# Patient Record
Sex: MALE | Race: WHITE | NOT HISPANIC OR LATINO | Employment: FULL TIME | URBAN - METROPOLITAN AREA
[De-identification: names, ages, dates, MRNs, and addresses within clinical notes are randomized per-mention and may not be internally consistent; named-entity substitution may affect disease eponyms.]

---

## 2017-02-28 ENCOUNTER — HOSPITAL ENCOUNTER (EMERGENCY)
Facility: CLINIC | Age: 42
Discharge: HOME | End: 2017-02-28
Attending: STUDENT IN AN ORGANIZED HEALTH CARE EDUCATION/TRAINING PROGRAM | Admitting: STUDENT IN AN ORGANIZED HEALTH CARE EDUCATION/TRAINING PROGRAM

## 2017-02-28 ENCOUNTER — APPOINTMENT (OUTPATIENT)
Dept: ULTRASOUND IMAGING | Facility: CLINIC | Age: 42
End: 2017-02-28
Attending: STUDENT IN AN ORGANIZED HEALTH CARE EDUCATION/TRAINING PROGRAM

## 2017-02-28 VITALS — TEMPERATURE: 98.2 F | DIASTOLIC BLOOD PRESSURE: 99 MMHG | SYSTOLIC BLOOD PRESSURE: 143 MMHG | OXYGEN SATURATION: 95 %

## 2017-02-28 DIAGNOSIS — K82.8 THICKENING OF WALL OF GALLBLADDER: ICD-10-CM

## 2017-02-28 DIAGNOSIS — R10.11 RUQ ABDOMINAL PAIN: ICD-10-CM

## 2017-02-28 LAB
ALBUMIN SERPL-MCNC: 3.6 G/DL (ref 3.4–5)
ALBUMIN UR-MCNC: 10 MG/DL
ALP SERPL-CCNC: 71 U/L (ref 40–150)
ALT SERPL W P-5'-P-CCNC: 19 U/L (ref 0–70)
ANION GAP SERPL CALCULATED.3IONS-SCNC: 8 MMOL/L (ref 3–14)
APPEARANCE UR: CLEAR
AST SERPL W P-5'-P-CCNC: 10 U/L (ref 0–45)
BASOPHILS # BLD AUTO: 0.1 10E9/L (ref 0–0.2)
BASOPHILS NFR BLD AUTO: 0.6 %
BILIRUB SERPL-MCNC: 0.2 MG/DL (ref 0.2–1.3)
BILIRUB UR QL STRIP: NEGATIVE
BUN SERPL-MCNC: 10 MG/DL (ref 7–30)
CALCIUM SERPL-MCNC: 8.4 MG/DL (ref 8.5–10.1)
CHLORIDE SERPL-SCNC: 111 MMOL/L (ref 94–109)
CO2 SERPL-SCNC: 24 MMOL/L (ref 20–32)
COLOR UR AUTO: YELLOW
CREAT SERPL-MCNC: 0.75 MG/DL (ref 0.66–1.25)
DIFFERENTIAL METHOD BLD: NORMAL
EOSINOPHIL # BLD AUTO: 0.3 10E9/L (ref 0–0.7)
EOSINOPHIL NFR BLD AUTO: 4 %
ERYTHROCYTE [DISTWIDTH] IN BLOOD BY AUTOMATED COUNT: 12.3 % (ref 10–15)
GFR SERPL CREATININE-BSD FRML MDRD: ABNORMAL ML/MIN/1.7M2
GLUCOSE SERPL-MCNC: 99 MG/DL (ref 70–99)
GLUCOSE UR STRIP-MCNC: NEGATIVE MG/DL
HCT VFR BLD AUTO: 41.3 % (ref 40–53)
HGB BLD-MCNC: 14 G/DL (ref 13.3–17.7)
HGB UR QL STRIP: NEGATIVE
IMM GRANULOCYTES # BLD: 0 10E9/L (ref 0–0.4)
IMM GRANULOCYTES NFR BLD: 0.2 %
KETONES UR STRIP-MCNC: NEGATIVE MG/DL
LEUKOCYTE ESTERASE UR QL STRIP: NEGATIVE
LIPASE SERPL-CCNC: 155 U/L (ref 73–393)
LYMPHOCYTES # BLD AUTO: 1.6 10E9/L (ref 0.8–5.3)
LYMPHOCYTES NFR BLD AUTO: 19.3 %
MCH RBC QN AUTO: 31.7 PG (ref 26.5–33)
MCHC RBC AUTO-ENTMCNC: 33.9 G/DL (ref 31.5–36.5)
MCV RBC AUTO: 93 FL (ref 78–100)
MONOCYTES # BLD AUTO: 1.2 10E9/L (ref 0–1.3)
MONOCYTES NFR BLD AUTO: 14.1 %
MUCOUS THREADS #/AREA URNS LPF: PRESENT /LPF
NEUTROPHILS # BLD AUTO: 5.1 10E9/L (ref 1.6–8.3)
NEUTROPHILS NFR BLD AUTO: 61.8 %
NITRATE UR QL: NEGATIVE
PH UR STRIP: 6 PH (ref 5–7)
PLATELET # BLD AUTO: 412 10E9/L (ref 150–450)
POTASSIUM SERPL-SCNC: 4 MMOL/L (ref 3.4–5.3)
PROT SERPL-MCNC: 6.7 G/DL (ref 6.8–8.8)
RBC # BLD AUTO: 4.42 10E12/L (ref 4.4–5.9)
RBC #/AREA URNS AUTO: 0 /HPF (ref 0–2)
SODIUM SERPL-SCNC: 143 MMOL/L (ref 133–144)
SP GR UR STRIP: 1.02 (ref 1–1.03)
SQUAMOUS #/AREA URNS AUTO: <1 /HPF (ref 0–1)
TROPONIN I SERPL-MCNC: NORMAL UG/L (ref 0–0.04)
URN SPEC COLLECT METH UR: ABNORMAL
UROBILINOGEN UR STRIP-MCNC: NORMAL MG/DL (ref 0–2)
WBC # BLD AUTO: 8.3 10E9/L (ref 4–11)
WBC #/AREA URNS AUTO: <1 /HPF (ref 0–2)

## 2017-02-28 PROCEDURE — 83690 ASSAY OF LIPASE: CPT | Performed by: STUDENT IN AN ORGANIZED HEALTH CARE EDUCATION/TRAINING PROGRAM

## 2017-02-28 PROCEDURE — 85025 COMPLETE CBC W/AUTO DIFF WBC: CPT | Performed by: STUDENT IN AN ORGANIZED HEALTH CARE EDUCATION/TRAINING PROGRAM

## 2017-02-28 PROCEDURE — 93010 ELECTROCARDIOGRAM REPORT: CPT | Performed by: STUDENT IN AN ORGANIZED HEALTH CARE EDUCATION/TRAINING PROGRAM

## 2017-02-28 PROCEDURE — 93005 ELECTROCARDIOGRAM TRACING: CPT

## 2017-02-28 PROCEDURE — 99285 EMERGENCY DEPT VISIT HI MDM: CPT | Mod: 25 | Performed by: STUDENT IN AN ORGANIZED HEALTH CARE EDUCATION/TRAINING PROGRAM

## 2017-02-28 PROCEDURE — 96374 THER/PROPH/DIAG INJ IV PUSH: CPT

## 2017-02-28 PROCEDURE — 99284 EMERGENCY DEPT VISIT MOD MDM: CPT | Mod: 25

## 2017-02-28 PROCEDURE — 84484 ASSAY OF TROPONIN QUANT: CPT | Performed by: STUDENT IN AN ORGANIZED HEALTH CARE EDUCATION/TRAINING PROGRAM

## 2017-02-28 PROCEDURE — 96361 HYDRATE IV INFUSION ADD-ON: CPT

## 2017-02-28 PROCEDURE — 76705 ECHO EXAM OF ABDOMEN: CPT

## 2017-02-28 PROCEDURE — 80053 COMPREHEN METABOLIC PANEL: CPT | Performed by: STUDENT IN AN ORGANIZED HEALTH CARE EDUCATION/TRAINING PROGRAM

## 2017-02-28 PROCEDURE — 99222 1ST HOSP IP/OBS MODERATE 55: CPT | Performed by: SURGERY

## 2017-02-28 PROCEDURE — 81001 URINALYSIS AUTO W/SCOPE: CPT | Performed by: STUDENT IN AN ORGANIZED HEALTH CARE EDUCATION/TRAINING PROGRAM

## 2017-02-28 PROCEDURE — 25000128 H RX IP 250 OP 636: Performed by: STUDENT IN AN ORGANIZED HEALTH CARE EDUCATION/TRAINING PROGRAM

## 2017-02-28 RX ORDER — HYDROMORPHONE HYDROCHLORIDE 1 MG/ML
0.5 INJECTION, SOLUTION INTRAMUSCULAR; INTRAVENOUS; SUBCUTANEOUS
Status: DISCONTINUED | OUTPATIENT
Start: 2017-02-28 | End: 2017-02-28 | Stop reason: HOSPADM

## 2017-02-28 RX ORDER — LIDOCAINE 40 MG/G
CREAM TOPICAL
Status: DISCONTINUED | OUTPATIENT
Start: 2017-02-28 | End: 2017-02-28 | Stop reason: HOSPADM

## 2017-02-28 RX ORDER — HYDROCODONE BITARTRATE AND ACETAMINOPHEN 5; 325 MG/1; MG/1
1-2 TABLET ORAL EVERY 4 HOURS PRN
Qty: 15 TABLET | Refills: 0 | Status: SHIPPED | OUTPATIENT
Start: 2017-02-28 | End: 2018-06-02

## 2017-02-28 RX ORDER — HYDROCODONE BITARTRATE AND ACETAMINOPHEN 5; 325 MG/1; MG/1
1-2 TABLET ORAL EVERY 4 HOURS PRN
Qty: 10 TABLET | Refills: 0 | Status: SHIPPED | OUTPATIENT
Start: 2017-02-28 | End: 2017-02-28

## 2017-02-28 RX ADMIN — SODIUM CHLORIDE 1000 ML: 9 INJECTION, SOLUTION INTRAVENOUS at 12:35

## 2017-02-28 RX ADMIN — HYDROMORPHONE HYDROCHLORIDE 0.5 MG: 10 INJECTION, SOLUTION INTRAMUSCULAR; INTRAVENOUS; SUBCUTANEOUS at 12:36

## 2017-02-28 NOTE — LETTER
SURGERYPLANNING/SCHEDULING WORKSHEET                              [facility] [place]  [address] [facility] [address] [phone]                          Sebastian Hatch                :  1975  MRN:  [id number]  Phone: [phone] (home)     Same Day Surgery   Surgeon: No name on file.  Diagnosis:   Symptomatic cholelithiasis  Allergies:  Review of patient's allergies indicates no known allergies.   A preoperative evaluation and physical will be done by this office.   ====================================================  Surgical Procedure:  General Surgery:laparoscopic cholecystectomy  Length of Procedure:  45  Type of anesthesia:  General  The proposed surgical procedure is considered LOW risk.  Date of Procedure:___3/2/17____    Time: __10:30_____________       Special Equipment: None  Informed Consent Obtained and Signed:  NO  ====================================================  Instructions to Same Day Surgery Staff  SONIA Stockings Knee High  Preop Antibiotic:  Ancef 2 gm IV  pre-op within one hour prior to incision For > 80 kg  Preop Pain Meds:  None  Preop Orders:  Routine Standing Orders.  ====================================================  Instructions to the patient:  Preop physical exam scheduled (within 30 days or 7 days prior) with:   ____________________  Clinic:  ____________________                                         Date______________Time_________________________  Come to the hospital at: ___SDS will mau with arrival time________  HOME PREPARATION:   Shower with Hibiclens the night before or the morning of surgery, gently cleaning skin from neck to feet  Bathe and brush teeth the morning of surgery.  Take medications with a sip of water the morning of surgery:   Check with  if taking insulin.  May have  a light meal, toast and clear liquids, up to 8 hrs before surgery  May have clear liquids  (liquids one can read through) up to 4 hrs before surgery  NOTHING after 4 hrs before surgery  Stop aspirin 7-10 days before surgery  Stop NSAIDS (Ibuproven, Naproxen, etc) 5 days before surgery  Stop Plavix 7-10 days before surgery  Need to arrange for a     No name on file.    2/28/2017  This form was electronically signed at chart closure                                                                        Chart Copy
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
## 2017-02-28 NOTE — H&P
New General Surgery Consultation Note        Sebastian Hatch  [id number]  1975  February 28, 2017     [facility]    Sebastian Hatch MRN# [id number]   Age: 41 year old YOB: 1975     Date of Admission:  2/28/2017    Reason for consult: Cholecystitis       Requesting physician: Dr. Ferraro       Level of consult: One-time consult to assist in determining a diagnosis and to recommend an appropriate treatment plan           Impression and Recommendation:   Impression:   Cholecystitis      Recommendations:   Cholecystectomy, laproscopic             Chief Complaint:   Abdominal pain, right upper quadrant     History is obtained from the patient         History of Present Illness:   This patient is a 41 year old  male without a significant past medical history who presents with the following condition requiring a hospital admission:    Abdominal Pain  Patient presents for evaluation of abdominal pain. The pain is located in the RUQ with radiation to R back.  Pain is described as stabbing, and measures 5/10 in intensity.  Onset of pain was a few hours ago. Aggravating factors include eating. Alleviating factors include recumbency. Associated symptoms include nausea and bloating.            Past Medical History:   I have reviewed this patient's past medical history.  Past Medical History   Diagnosis Date     Depressive disorder      Gastro-oesophageal reflux disease      Migraine      NO ACTIVE PROBLEMS              Past Surgical History:   I have reviewed this patient's past surgical history  Past Surgical History   Procedure Laterality Date     Surgical history of -        left knee ACL replacement             Social History:   I have reviewed this patient's social history  Social History     Social History     Marital status: Single     Spouse name: N/A     Number of children: N/A     Years of education: N/A     Social History Main  Topics     Smoking status: Current Every Day Smoker     Packs/day: 1.00     Years: 10.00     Types: Cigarettes     Smokeless tobacco: Former User     Types: Chew     Alcohol use No      Comment: has not had any alchol in 5 months     Drug use: No     Sexual activity: Yes     Partners: Female     Other Topics Concern     Parent/Sibling W/ Cabg, Mi Or Angioplasty Before 65f 55m? No     Social History Narrative               Family History:   I have reviewed this patient's family history  Family History   Problem Relation Age of Onset     DIABETES Father      Hypertension Father      Unknown/Adopted Maternal Grandfather      Unknown/Adopted Paternal Grandmother      CANCER Paternal Grandmother      Respiratory Paternal Grandfather      lung cancer     C.A.D. Paternal Grandfather      Cardiovascular Sister       at 3 days, malformed aorta     Genitourinary Problems Other      kidney failure, on dialysis, unclear reason     Asthma No family hx of      CEREBROVASCULAR DISEASE No family hx of      Breast Cancer No family hx of      Prostate Cancer No family hx of      Cancer - colorectal Mother                Immunizations:     Immunization History   Administered Date(s) Administered     Influenza (IIV3) 2010     Influenza Vaccine IM 3yrs+ 4 Valent IIV4 2013, 2014     Mantoux 10/10/2011     TDAP (ADACEL AGES 11-64) 2010             Allergies:   No Known Allergies          Medications:     Current Facility-Administered Medications   Medication     HYDROmorphone (PF) (DILAUDID) injection 0.5 mg     lidocaine 1 % 1 mL     lidocaine (LMX4) kit     sodium chloride (PF) 0.9% PF flush 3 mL     sodium chloride (PF) 0.9% PF flush 3 mL     Current Outpatient Prescriptions   Medication Sig     RANITIDINE HCL PO Take 150 mg by mouth daily as needed for heartburn     HYDROcodone-acetaminophen (NORCO) 5-325 MG per tablet Take 1-2 tablets by mouth every 4 hours as needed for moderate to severe pain      naproxen 500 MG TBEC Take 500 mg by mouth 2 times daily as needed (Needs follow-up appointment for this medication)     sumatriptan (IMITREX) 50 MG tablet Take 1 tablet by mouth at onset of headache for migraine. May repeat in 2 hours if needed: max 2/day; average number of headaches monthly             Review of Systems:   C: NEGATIVE for fever, chills, change in weight  I: NEGATIVE for worrisome rashes, moles or lesions  E: NEGATIVE for vision changes or irritation  E/M: NEGATIVE for ear, mouth and throat problems  R: NEGATIVE for significant cough or SOB  B: NEGATIVE for masses, tenderness or discharge  CV: NEGATIVE for chest pain, palpitations or peripheral edema  GI: NEGATIVE for nausea, abdominal pain, heartburn, or change in bowel habits  : NEGATIVE for frequency, dysuria, or hematuria  M: NEGATIVE for significant arthralgias or myalgia  N: NEGATIVE for weakness, dizziness or paresthesias  E: NEGATIVE for temperature intolerance, skin/hair changes  H: NEGATIVE for bleeding problems  P: NEGATIVE for changes in mood or affect          Physical Exam:   Vitals were reviewed  All vitals stable  Neck:   Supple, symmetrical, trachea midline, no adenopathy, thyroid symmetric, not enlarged and no tenderness, skin normal     Chest / Breast:   Lungs clear to ascultation     Abdomen:   No scars, normal bowel sounds, soft, non-distended, non-tender, no masses palpated, no hepatosplenomegally     Musculoskeletal:   There is no redness, warmth, or swelling of the joints.  Full range of motion noted.  Motor strength is 5 out of 5 all extremities bilaterally.  Tone is normal.     Constitutional:   awake, alert, cooperative, no apparent distress, and appears stated age     Eyes:   vision intact     Lungs:   No increased work of breathing, good air exchange, clear to auscultation bilaterally, no crackles or wheezing     Cardiovascular:   Normal apical impulse, regular rate and rhythm, normal S1 and S2, no S3 or S4, and no  murmur noted     Neurologic:   Awake, alert, oriented to name, place and time.  Cranial nerves II-XII are grossly intact.  Motor is 5 out of 5 bilaterally.  Cerebellar finger to nose, heel to shin intact.  Sensory is intact.  Babinski down going, Romberg negative, and gait is normal.     Neuropsychiatric:   General: normal, calm and normal eye contact     Skin:   no bruising or bleeding, normal skin color, texture, turgor and no rashes             Data:     Results for orders placed or performed during the hospital encounter of 02/28/17 (from the past 24 hour(s))   UA reflex to Microscopic   Result Value Ref Range    Color Urine Yellow     Appearance Urine Clear     Glucose Urine Negative NEG mg/dL    Bilirubin Urine Negative NEG    Ketones Urine Negative NEG mg/dL    Specific Gravity Urine 1.019 1.003 - 1.035    Blood Urine Negative NEG    pH Urine 6.0 5.0 - 7.0 pH    Protein Albumin Urine 10 (A) NEG mg/dL    Urobilinogen mg/dL Normal 0.0 - 2.0 mg/dL    Nitrite Urine Negative NEG    Leukocyte Esterase Urine Negative NEG    Source Midstream Urine     RBC Urine 0 0 - 2 /HPF    WBC Urine <1 0 - 2 /HPF    Squamous Epithelial /HPF Urine <1 0 - 1 /HPF    Mucous Urine Present (A) NEG /LPF   CBC with platelets differential   Result Value Ref Range    WBC 8.3 4.0 - 11.0 10e9/L    RBC Count 4.42 4.4 - 5.9 10e12/L    Hemoglobin 14.0 13.3 - 17.7 g/dL    Hematocrit 41.3 40.0 - 53.0 %    MCV 93 78 - 100 fl    MCH 31.7 26.5 - 33.0 pg    MCHC 33.9 31.5 - 36.5 g/dL    RDW 12.3 10.0 - 15.0 %    Platelet Count 412 150 - 450 10e9/L    Diff Method Automated Method     % Neutrophils 61.8 %    % Lymphocytes 19.3 %    % Monocytes 14.1 %    % Eosinophils 4.0 %    % Basophils 0.6 %    % Immature Granulocytes 0.2 %    Absolute Neutrophil 5.1 1.6 - 8.3 10e9/L    Absolute Lymphocytes 1.6 0.8 - 5.3 10e9/L    Absolute Monocytes 1.2 0.0 - 1.3 10e9/L    Absolute Eosinophils 0.3 0.0 - 0.7 10e9/L    Absolute Basophils 0.1 0.0 - 0.2 10e9/L    Abs  Immature Granulocytes 0.0 0 - 0.4 10e9/L   Comprehensive metabolic panel   Result Value Ref Range    Sodium 143 133 - 144 mmol/L    Potassium 4.0 3.4 - 5.3 mmol/L    Chloride 111 (H) 94 - 109 mmol/L    Carbon Dioxide 24 20 - 32 mmol/L    Anion Gap 8 3 - 14 mmol/L    Glucose 99 70 - 99 mg/dL    Urea Nitrogen 10 7 - 30 mg/dL    Creatinine 0.75 0.66 - 1.25 mg/dL    GFR Estimate >90  Non  GFR Calc   >60 mL/min/1.7m2    GFR Estimate If Black >90   GFR Calc   >60 mL/min/1.7m2    Calcium 8.4 (L) 8.5 - 10.1 mg/dL    Bilirubin Total 0.2 0.2 - 1.3 mg/dL    Albumin 3.6 3.4 - 5.0 g/dL    Protein Total 6.7 (L) 6.8 - 8.8 g/dL    Alkaline Phosphatase 71 40 - 150 U/L    ALT 19 0 - 70 U/L    AST 10 0 - 45 U/L   Lipase   Result Value Ref Range    Lipase 155 73 - 393 U/L   Troponin I   Result Value Ref Range    Troponin I ES  0.000 - 0.045 ug/L     <0.015  The 99th percentile for upper reference range is 0.045 ug/L.  Troponin values in   the range of 0.045 - 0.120 ug/L may be associated with risks of adverse   clinical events.     US Abdomen Limited    Narrative    ULTRASOUND ABDOMEN LIMITED February 28, 2017 1:33 PM     HISTORY: Right upper quadrant pain.    FINDINGS: There is mild gallbladder distention. There is mild  gallbladder wall thickening (3.7 mm). Within the neck of the  gallbladder, there is a 7 mm nonshadowing lesion which could represent  a polyp or small sludge ball. No shadowing stones identified. Negative  sonographic Ferguson's sign. No focal hepatic lesion or common bile duct  dilatation. The pancreas is significantly obscured. 0.9 cm right renal  cyst.      Impression    IMPRESSION:   1. Mild gallbladder distention and mild wall thickening. This is  nonspecific, but could relate to cholecystitis. Clinical correlation  recommended.  2. 0.7 cm gallbladder neck lesion which may represent a polyp or  sludge.    JHONY DIAMOND MD     All imaging studies reviewed by me.     Attestation:  I have  reviewed today's vital signs, notes, medications, labs and imaging.    Dwain Kent MD

## 2017-02-28 NOTE — LETTER
[facility] EMERGENCY DEPARTMENT  [address] [facility] [address] [facility] [address]  [phone]    2017    Sebastian Hatch  [address] [facility] [address]  [phone] (home)     : 1975      To Whom it may concern:    Sebastian Hatch was seen in our Emergency Department today, 2017.  I expect his condition to improve over the next 2-3 days.  He may return to work/school when improved.    Sincerely,        Efrain Ferraro

## 2017-02-28 NOTE — ED AVS SNAPSHOT
[facility] Emergency Department    [address] [facility] [address]    Phone:  [phone]    Fax:  [phone]                                       Sebastian Hatch   MRN: [id number]    Department:  [facility] Emergency Department   Date of Visit:  2/28/2017           Patient Information     Date Of Birth          1975        Your diagnoses for this visit were:     RUQ abdominal pain     Thickening of wall of gallbladder        You were seen by Efrain Ferraro DO.      Follow-up Information     Follow up with [facility]. Go in 2 days.    Specialty:  Surgery    Why:  Followup at your scheduled surgical appointment Thursday, 3/2/17.    Contact information:    [address] [place] [id number] [phone]    Additional information:    The medical center is located at   [address] [facility]. (between [address]   [address] in [place], [place] miles north   of [place]).      Discharge References/Attachments     CHOLECYSTITIS (PRESUMED) (ENGLISH)    CHOLECYSTECTOMY (ENGLISH)      24 Hour Appointment Hotline       To make an appointment at any [facility] call [phone]). If you don't have a family doctor or clinic, we will help you find one. [facility] are conveniently located to serve the needs of you and your family.             Review of your medicines      START taking        Dose / Directions Last dose taken    HYDROcodone-acetaminophen 5-325 MG per tablet   Commonly known as:  NORCO   Dose:  1-2 tablet   Quantity:  15 tablet        Take 1-2 tablets by mouth every 4 hours as needed for moderate to severe pain   Refills:  0          Our records show that you are taking the medicines listed below. If these are incorrect, please call your family doctor or clinic.        Dose / Directions Last dose taken    naproxen 500 MG Tbec   Dose:  500 mg   Indication:  Vascular Headache   Quantity:  60 tablet        Take 500 mg by mouth 2  times daily as needed (Needs follow-up appointment for this medication)   Refills:  0        RANITIDINE HCL PO   Dose:  150 mg        Take 150 mg by mouth daily as needed for heartburn   Refills:  0        SUMAtriptan 50 MG tablet   Commonly known as:  IMITREX   Dose:  50 mg   Quantity:  9 tablet        Take 1 tablet by mouth at onset of headache for migraine. May repeat in 2 hours if needed: max 2/day; average number of headaches monthly   Refills:  3                Prescriptions were sent or printed at these locations (1 Prescription)                   [address]   [address] [facility] [address]    Telephone:  [phone]   Fax:  [phone]   Hours:                  Printed at Department/Unit printer (1 of 1)         HYDROcodone-acetaminophen (NORCO) 5-325 MG per tablet                Procedures and tests performed during your visit     CBC with platelets differential    Comprehensive metabolic panel    EKG 12-lead, tracing only    Lipase    Peripheral IV: Standard    Pulse oximetry nursing    Troponin I    UA reflex to Microscopic    US Abdomen Limited    Vital signs      Orders Needing Specimen Collection     None      Pending Results     No orders found from 2/26/2017 to 3/1/2017.            Pending Culture Results     No orders found from 2/26/2017 to 3/1/2017.             Test Results from your hospital stay     2/28/2017 12:47 PM - Interface, [email or web address] Results      Component Results     Component Value Ref Range & Units Status    WBC 8.3 4.0 - 11.0 10e9/L Final    RBC Count 4.42 4.4 - 5.9 10e12/L Final    Hemoglobin 14.0 13.3 - 17.7 g/dL Final    Hematocrit 41.3 40.0 - 53.0 % Final    MCV 93 78 - 100 fl Final    MCH 31.7 26.5 - 33.0 pg Final    MCHC 33.9 31.5 - 36.5 g/dL Final    RDW 12.3 10.0 - 15.0 % Final    Platelet Count 412 150 - 450 10e9/L Final    Diff Method Automated Method  Final    % Neutrophils 61.8 % Final    % Lymphocytes 19.3 % Final    % Monocytes  14.1 % Final    % Eosinophils 4.0 % Final    % Basophils 0.6 % Final    % Immature Granulocytes 0.2 % Final    Absolute Neutrophil 5.1 1.6 - 8.3 10e9/L Final    Absolute Lymphocytes 1.6 0.8 - 5.3 10e9/L Final    Absolute Monocytes 1.2 0.0 - 1.3 10e9/L Final    Absolute Eosinophils 0.3 0.0 - 0.7 10e9/L Final    Absolute Basophils 0.1 0.0 - 0.2 10e9/L Final    Abs Immature Granulocytes 0.0 0 - 0.4 10e9/L Final         2/28/2017  1:09 PM - Interface, [employer] Results      Component Results     Component Value Ref Range & Units Status    Sodium 143 133 - 144 mmol/L Final    Potassium 4.0 3.4 - 5.3 mmol/L Final    Chloride 111 (H) 94 - 109 mmol/L Final    Carbon Dioxide 24 20 - 32 mmol/L Final    Anion Gap 8 3 - 14 mmol/L Final    Glucose 99 70 - 99 mg/dL Final    Urea Nitrogen 10 7 - 30 mg/dL Final    Creatinine 0.75 0.66 - 1.25 mg/dL Final    GFR Estimate >90  Non  GFR Calc   >60 mL/min/1.7m2 Final    GFR Estimate If Black >90   GFR Calc   >60 mL/min/1.7m2 Final    Calcium 8.4 (L) 8.5 - 10.1 mg/dL Final    Bilirubin Total 0.2 0.2 - 1.3 mg/dL Final    Albumin 3.6 3.4 - 5.0 g/dL Final    Protein Total 6.7 (L) 6.8 - 8.8 g/dL Final    Alkaline Phosphatase 71 40 - 150 U/L Final    ALT 19 0 - 70 U/L Final    AST 10 0 - 45 U/L Final         2/28/2017  1:09 PM - Interface, [employer] Results      Component Results     Component Value Ref Range & Units Status    Lipase 155 73 - 393 U/L Final         2/28/2017 12:37 PM - Interface, [employer] Results      Component Results     Component Value Ref Range & Units Status    Color Urine Yellow  Final    Appearance Urine Clear  Final    Glucose Urine Negative NEG mg/dL Final    Bilirubin Urine Negative NEG Final    Ketones Urine Negative NEG mg/dL Final    Specific Gravity Urine 1.019 1.003 - 1.035 Final    Blood Urine Negative NEG Final    pH Urine 6.0 5.0 - 7.0 pH Final    Protein Albumin Urine 10 (A) NEG mg/dL Final    Urobilinogen mg/dL Normal 0.0 - 2.0  mg/dL Final    Nitrite Urine Negative NEG Final    Leukocyte Esterase Urine Negative NEG Final    Source Midstream Urine  Final    RBC Urine 0 0 - 2 /HPF Final    WBC Urine <1 0 - 2 /HPF Final    Squamous Epithelial /HPF Urine <1 0 - 1 /HPF Final    Mucous Urine Present (A) NEG /LPF Final         2/28/2017  2:42 PM - Interface, Radiant Ib      Narrative     ULTRASOUND ABDOMEN LIMITED February 28, 2017 1:33 PM     HISTORY: Right upper quadrant pain.    FINDINGS: There is mild gallbladder distention. There is mild  gallbladder wall thickening (3.7 mm). Within the neck of the  gallbladder, there is a 7 mm nonshadowing lesion which could represent  a polyp or small sludge ball. No shadowing stones identified. Negative  sonographic Ferguson's sign. No focal hepatic lesion or common bile duct  dilatation. The pancreas is significantly obscured. 0.9 cm right renal  cyst.        Impression     IMPRESSION:   1. Mild gallbladder distention and mild wall thickening. This is  nonspecific, but could relate to cholecystitis. Clinical correlation  recommended.  2. 0.7 cm gallbladder neck lesion which may represent a polyp or  sludge.    JHONY DIAMOND MD         2/28/2017  1:09 PM - Interface, [employer] Results      Component Results     Component Value Ref Range & Units Status    Troponin I ES  0.000 - 0.045 ug/L Final    <0.015  The 99th percentile for upper reference range is 0.045 ug/L.  Troponin values in   the range of 0.045 - 0.120 ug/L may be associated with risks of adverse   clinical events.                  Thank you for choosing [facility]       Thank you for choosing [facility] for your care. Our goal is always to provide you with excellent care. Hearing back from our patients is one way we can continue to improve our services. Please take a few minutes to complete the written survey that you may receive in the mail after you visit with us. Thank you!        Care EveryWhere ID     This is your Care EveryWhere ID. This could be  used by other organizations to access your [facility] medical records  [id number]        After Visit Summary       This is your record. Keep this with you and show to your community pharmacist(s) and doctor(s) at your next visit.

## 2017-02-28 NOTE — ED AVS SNAPSHOT
[facility] Emergency Department    [address] [facility] [address]    Phone:  [phone]    Fax:  [phone]                                       Sebastian Hatch   MRN: [id number]    Department:  [facility] Emergency Department   Date of Visit:  2/28/2017           After Visit Summary Signature Page     I have received my discharge instructions, and my questions have been answered. I have discussed any challenges I see with this plan with the nurse or doctor.    ..........................................................................................................................................  Patient/Patient Representative Signature      ..........................................................................................................................................  Patient Representative Print Name and Relationship to Patient    ..................................................               ................................................  Date                                            Time    ..........................................................................................................................................  Reviewed by Signature/Title    ...................................................              ..............................................  Date                                                            Time

## 2017-03-01 ENCOUNTER — ANESTHESIA EVENT (OUTPATIENT)
Dept: SURGERY | Facility: CLINIC | Age: 42
End: 2017-03-01

## 2017-03-02 ENCOUNTER — HOSPITAL ENCOUNTER (OUTPATIENT)
Facility: CLINIC | Age: 42
Discharge: HOME | End: 2017-03-02
Attending: SURGERY | Admitting: SURGERY

## 2017-03-02 ENCOUNTER — ANESTHESIA (OUTPATIENT)
Dept: SURGERY | Facility: CLINIC | Age: 42
End: 2017-03-02

## 2017-03-02 VITALS
HEART RATE: 68 BPM | WEIGHT: 171 LBS | RESPIRATION RATE: 16 BRPM | BODY MASS INDEX: 23.16 KG/M2 | DIASTOLIC BLOOD PRESSURE: 70 MMHG | OXYGEN SATURATION: 94 % | HEIGHT: 72 IN | TEMPERATURE: 97.7 F | SYSTOLIC BLOOD PRESSURE: 144 MMHG

## 2017-03-02 DIAGNOSIS — G89.18 POST-OP PAIN: Primary | ICD-10-CM

## 2017-03-02 PROCEDURE — 36000058 ZZH SURGERY LEVEL 3 EA 15 ADDTL MIN: Performed by: SURGERY

## 2017-03-02 PROCEDURE — 27210794 ZZH OR GENERAL SUPPLY STERILE: Performed by: SURGERY

## 2017-03-02 PROCEDURE — 71000027 ZZH RECOVERY PHASE 2 EACH 15 MINS: Performed by: SURGERY

## 2017-03-02 PROCEDURE — 25000125 ZZHC RX 250: Performed by: NURSE ANESTHETIST, CERTIFIED REGISTERED

## 2017-03-02 PROCEDURE — 71000015 ZZH RECOVERY PHASE 1 LEVEL 2 EA ADDTL HR: Performed by: SURGERY

## 2017-03-02 PROCEDURE — 36000056 ZZH SURGERY LEVEL 3 1ST 30 MIN: Performed by: SURGERY

## 2017-03-02 PROCEDURE — 25000128 H RX IP 250 OP 636: Performed by: NURSE ANESTHETIST, CERTIFIED REGISTERED

## 2017-03-02 PROCEDURE — 37000008 ZZH ANESTHESIA TECHNICAL FEE, 1ST 30 MIN: Performed by: SURGERY

## 2017-03-02 PROCEDURE — 40000305 ZZH STATISTIC PRE PROC ASSESS I: Performed by: SURGERY

## 2017-03-02 PROCEDURE — 88304 TISSUE EXAM BY PATHOLOGIST: CPT | Mod: 26 | Performed by: SURGERY

## 2017-03-02 PROCEDURE — 47562 LAPAROSCOPIC CHOLECYSTECTOMY: CPT | Mod: AS | Performed by: PHYSICIAN ASSISTANT

## 2017-03-02 PROCEDURE — 37000009 ZZH ANESTHESIA TECHNICAL FEE, EACH ADDTL 15 MIN: Performed by: SURGERY

## 2017-03-02 PROCEDURE — 47562 LAPAROSCOPIC CHOLECYSTECTOMY: CPT | Performed by: SURGERY

## 2017-03-02 PROCEDURE — 25800025 ZZH RX 258: Performed by: NURSE ANESTHETIST, CERTIFIED REGISTERED

## 2017-03-02 PROCEDURE — 88304 TISSUE EXAM BY PATHOLOGIST: CPT | Performed by: SURGERY

## 2017-03-02 PROCEDURE — 71000014 ZZH RECOVERY PHASE 1 LEVEL 2 FIRST HR: Performed by: SURGERY

## 2017-03-02 PROCEDURE — 27110028 ZZH OR GENERAL SUPPLY NON-STERILE: Performed by: SURGERY

## 2017-03-02 PROCEDURE — 25000132 ZZH RX MED GY IP 250 OP 250 PS 637: Performed by: NURSE ANESTHETIST, CERTIFIED REGISTERED

## 2017-03-02 PROCEDURE — 25000125 ZZHC RX 250: Performed by: SURGERY

## 2017-03-02 RX ORDER — DEXAMETHASONE SODIUM PHOSPHATE 4 MG/ML
INJECTION, SOLUTION INTRA-ARTICULAR; INTRALESIONAL; INTRAMUSCULAR; INTRAVENOUS; SOFT TISSUE PRN
Status: DISCONTINUED | OUTPATIENT
Start: 2017-03-02 | End: 2017-03-02

## 2017-03-02 RX ORDER — SODIUM CHLORIDE, SODIUM LACTATE, POTASSIUM CHLORIDE, CALCIUM CHLORIDE 600; 310; 30; 20 MG/100ML; MG/100ML; MG/100ML; MG/100ML
INJECTION, SOLUTION INTRAVENOUS CONTINUOUS
Status: DISCONTINUED | OUTPATIENT
Start: 2017-03-02 | End: 2017-03-02 | Stop reason: HOSPADM

## 2017-03-02 RX ORDER — FENTANYL CITRATE 50 UG/ML
25-50 INJECTION, SOLUTION INTRAMUSCULAR; INTRAVENOUS
Status: DISCONTINUED | OUTPATIENT
Start: 2017-03-02 | End: 2017-03-02 | Stop reason: HOSPADM

## 2017-03-02 RX ORDER — ONDANSETRON 2 MG/ML
4 INJECTION INTRAMUSCULAR; INTRAVENOUS EVERY 30 MIN PRN
Status: DISCONTINUED | OUTPATIENT
Start: 2017-03-02 | End: 2017-03-06 | Stop reason: HOSPADM

## 2017-03-02 RX ORDER — NALOXONE HYDROCHLORIDE 0.4 MG/ML
.1-.4 INJECTION, SOLUTION INTRAMUSCULAR; INTRAVENOUS; SUBCUTANEOUS
Status: ACTIVE | OUTPATIENT
Start: 2017-03-02 | End: 2017-03-03

## 2017-03-02 RX ORDER — PROPOFOL 10 MG/ML
INJECTION, EMULSION INTRAVENOUS PRN
Status: DISCONTINUED | OUTPATIENT
Start: 2017-03-02 | End: 2017-03-02

## 2017-03-02 RX ORDER — LIDOCAINE 40 MG/G
CREAM TOPICAL
Status: DISCONTINUED | OUTPATIENT
Start: 2017-03-02 | End: 2017-03-02 | Stop reason: HOSPADM

## 2017-03-02 RX ORDER — MEPERIDINE HYDROCHLORIDE 25 MG/ML
12.5 INJECTION INTRAMUSCULAR; INTRAVENOUS; SUBCUTANEOUS
Status: DISCONTINUED | OUTPATIENT
Start: 2017-03-02 | End: 2017-03-06 | Stop reason: HOSPADM

## 2017-03-02 RX ORDER — HYDROMORPHONE HYDROCHLORIDE 1 MG/ML
.3-.5 INJECTION, SOLUTION INTRAMUSCULAR; INTRAVENOUS; SUBCUTANEOUS EVERY 10 MIN PRN
Status: DISCONTINUED | OUTPATIENT
Start: 2017-03-02 | End: 2017-03-06 | Stop reason: HOSPADM

## 2017-03-02 RX ORDER — PROPOFOL 10 MG/ML
INJECTION, EMULSION INTRAVENOUS CONTINUOUS PRN
Status: DISCONTINUED | OUTPATIENT
Start: 2017-03-02 | End: 2017-03-02

## 2017-03-02 RX ORDER — SODIUM CHLORIDE, SODIUM LACTATE, POTASSIUM CHLORIDE, CALCIUM CHLORIDE 600; 310; 30; 20 MG/100ML; MG/100ML; MG/100ML; MG/100ML
INJECTION, SOLUTION INTRAVENOUS CONTINUOUS
Status: DISCONTINUED | OUTPATIENT
Start: 2017-03-02 | End: 2017-03-06 | Stop reason: HOSPADM

## 2017-03-02 RX ORDER — GLYCOPYRROLATE 0.2 MG/ML
INJECTION, SOLUTION INTRAMUSCULAR; INTRAVENOUS PRN
Status: DISCONTINUED | OUTPATIENT
Start: 2017-03-02 | End: 2017-03-02

## 2017-03-02 RX ORDER — CEFAZOLIN SODIUM 2 G/100ML
2 INJECTION, SOLUTION INTRAVENOUS
Status: DISCONTINUED | OUTPATIENT
Start: 2017-03-02 | End: 2017-03-02 | Stop reason: HOSPADM

## 2017-03-02 RX ORDER — LIDOCAINE HYDROCHLORIDE 10 MG/ML
INJECTION, SOLUTION INFILTRATION; PERINEURAL PRN
Status: DISCONTINUED | OUTPATIENT
Start: 2017-03-02 | End: 2017-03-02

## 2017-03-02 RX ORDER — HYDROCODONE BITARTRATE AND ACETAMINOPHEN 5; 325 MG/1; MG/1
1-2 TABLET ORAL
Status: DISCONTINUED | OUTPATIENT
Start: 2017-03-02 | End: 2017-03-06 | Stop reason: HOSPADM

## 2017-03-02 RX ORDER — ONDANSETRON 2 MG/ML
INJECTION INTRAMUSCULAR; INTRAVENOUS PRN
Status: DISCONTINUED | OUTPATIENT
Start: 2017-03-02 | End: 2017-03-02

## 2017-03-02 RX ORDER — BUPIVACAINE HYDROCHLORIDE AND EPINEPHRINE 2.5; 5 MG/ML; UG/ML
INJECTION, SOLUTION INFILTRATION; PERINEURAL PRN
Status: DISCONTINUED | OUTPATIENT
Start: 2017-03-02 | End: 2017-03-02 | Stop reason: HOSPADM

## 2017-03-02 RX ORDER — FENTANYL CITRATE 50 UG/ML
INJECTION, SOLUTION INTRAMUSCULAR; INTRAVENOUS PRN
Status: DISCONTINUED | OUTPATIENT
Start: 2017-03-02 | End: 2017-03-02

## 2017-03-02 RX ORDER — ONDANSETRON 4 MG/1
4 TABLET, ORALLY DISINTEGRATING ORAL EVERY 30 MIN PRN
Status: DISCONTINUED | OUTPATIENT
Start: 2017-03-02 | End: 2017-03-06 | Stop reason: HOSPADM

## 2017-03-02 RX ORDER — HYDROCODONE BITARTRATE AND ACETAMINOPHEN 5; 325 MG/1; MG/1
1-2 TABLET ORAL EVERY 4 HOURS PRN
Qty: 30 TABLET | Refills: 0 | Status: SHIPPED | OUTPATIENT
Start: 2017-03-02 | End: 2017-03-30

## 2017-03-02 RX ORDER — ONDANSETRON 4 MG/1
4-8 TABLET, ORALLY DISINTEGRATING ORAL EVERY 8 HOURS PRN
Qty: 20 TABLET | Refills: 0 | Status: SHIPPED | OUTPATIENT
Start: 2017-03-02 | End: 2018-06-02

## 2017-03-02 RX ORDER — FENTANYL CITRATE 50 UG/ML
25-50 INJECTION, SOLUTION INTRAMUSCULAR; INTRAVENOUS
Status: DISCONTINUED | OUTPATIENT
Start: 2017-03-02 | End: 2017-03-06 | Stop reason: HOSPADM

## 2017-03-02 RX ORDER — SCOLOPAMINE TRANSDERMAL SYSTEM 1 MG/1
1 PATCH, EXTENDED RELEASE TRANSDERMAL ONCE
Status: COMPLETED | OUTPATIENT
Start: 2017-03-02 | End: 2017-03-02

## 2017-03-02 RX ORDER — CEFAZOLIN SODIUM 1 G/3ML
1 INJECTION, POWDER, FOR SOLUTION INTRAMUSCULAR; INTRAVENOUS SEE ADMIN INSTRUCTIONS
Status: DISCONTINUED | OUTPATIENT
Start: 2017-03-02 | End: 2017-03-02 | Stop reason: HOSPADM

## 2017-03-02 RX ORDER — NEOSTIGMINE METHYLSULFATE 1 MG/ML
VIAL (ML) INJECTION PRN
Status: DISCONTINUED | OUTPATIENT
Start: 2017-03-02 | End: 2017-03-02

## 2017-03-02 RX ORDER — HYDROXYZINE HYDROCHLORIDE 50 MG/1
50 TABLET, FILM COATED ORAL ONCE
Status: COMPLETED | OUTPATIENT
Start: 2017-03-02 | End: 2017-03-02

## 2017-03-02 RX ADMIN — HYDROXYZINE HYDROCHLORIDE 50 MG: 50 TABLET, FILM COATED ORAL at 11:59

## 2017-03-02 RX ADMIN — PROPOFOL 200 MG: 10 INJECTION, EMULSION INTRAVENOUS at 10:49

## 2017-03-02 RX ADMIN — SODIUM CHLORIDE, POTASSIUM CHLORIDE, SODIUM LACTATE AND CALCIUM CHLORIDE: 600; 310; 30; 20 INJECTION, SOLUTION INTRAVENOUS at 09:57

## 2017-03-02 RX ADMIN — FENTANYL CITRATE 100 MCG: 50 INJECTION, SOLUTION INTRAMUSCULAR; INTRAVENOUS at 11:03

## 2017-03-02 RX ADMIN — MEPERIDINE HYDROCHLORIDE 12.5 MG: 25 INJECTION, SOLUTION INTRAMUSCULAR; INTRAVENOUS; SUBCUTANEOUS at 12:05

## 2017-03-02 RX ADMIN — FENTANYL CITRATE 150 MCG: 50 INJECTION, SOLUTION INTRAMUSCULAR; INTRAVENOUS at 10:47

## 2017-03-02 RX ADMIN — ONDANSETRON 4 MG: 2 INJECTION INTRAMUSCULAR; INTRAVENOUS at 11:12

## 2017-03-02 RX ADMIN — ROCURONIUM BROMIDE 40 MG: 10 INJECTION INTRAVENOUS at 10:49

## 2017-03-02 RX ADMIN — FENTANYL CITRATE 50 MCG: 50 INJECTION INTRAMUSCULAR; INTRAVENOUS at 12:01

## 2017-03-02 RX ADMIN — LIDOCAINE HYDROCHLORIDE 1 ML: 10 INJECTION, SOLUTION INFILTRATION; PERINEURAL at 09:57

## 2017-03-02 RX ADMIN — FENTANYL CITRATE 50 MCG: 50 INJECTION INTRAMUSCULAR; INTRAVENOUS at 11:54

## 2017-03-02 RX ADMIN — Medication 3 MG: at 11:27

## 2017-03-02 RX ADMIN — SCOPALAMINE 1 PATCH: 1 PATCH, EXTENDED RELEASE TRANSDERMAL at 09:56

## 2017-03-02 RX ADMIN — HYDROMORPHONE HYDROCHLORIDE 0.5 MG: 10 INJECTION, SOLUTION INTRAMUSCULAR; INTRAVENOUS; SUBCUTANEOUS at 12:21

## 2017-03-02 RX ADMIN — PROPOFOL 150 MCG/KG/MIN: 10 INJECTION, EMULSION INTRAVENOUS at 10:49

## 2017-03-02 RX ADMIN — HYDROMORPHONE HYDROCHLORIDE 1 MG: 1 INJECTION, SOLUTION INTRAMUSCULAR; INTRAVENOUS; SUBCUTANEOUS at 11:15

## 2017-03-02 RX ADMIN — LIDOCAINE HYDROCHLORIDE 50 MG: 10 INJECTION, SOLUTION INFILTRATION; PERINEURAL at 10:49

## 2017-03-02 RX ADMIN — MIDAZOLAM HYDROCHLORIDE 2 MG: 1 INJECTION, SOLUTION INTRAMUSCULAR; INTRAVENOUS at 10:44

## 2017-03-02 RX ADMIN — HYDROMORPHONE HYDROCHLORIDE 0.5 MG: 10 INJECTION, SOLUTION INTRAMUSCULAR; INTRAVENOUS; SUBCUTANEOUS at 12:33

## 2017-03-02 RX ADMIN — DEXAMETHASONE SODIUM PHOSPHATE 4 MG: 4 INJECTION, SOLUTION INTRAMUSCULAR; INTRAVENOUS at 10:53

## 2017-03-02 RX ADMIN — GLYCOPYRROLATE 0.6 MG: 0.2 INJECTION, SOLUTION INTRAMUSCULAR; INTRAVENOUS at 11:27

## 2017-03-02 ASSESSMENT — LIFESTYLE VARIABLES: TOBACCO_USE: 1

## 2017-03-02 NOTE — IP AVS SNAPSHOT
[facility] PreOP/Phase II    [address] [facility] [address]    Phone:  [phone]    Fax:  [phone]                                       After Visit Summary   3/2/2017    Sebastian Hatch    MRN: [id number]           After Visit Summary Signature Page     I have received my discharge instructions, and my questions have been answered. I have discussed any challenges I see with this plan with the nurse or doctor.    ..........................................................................................................................................  Patient/Patient Representative Signature      ..........................................................................................................................................  Patient Representative Print Name and Relationship to Patient    ..................................................               ................................................  Date                                            Time    ..........................................................................................................................................  Reviewed by Signature/Title    ...................................................              ..............................................  Date                                                            Time

## 2017-03-02 NOTE — ANESTHESIA CARE TRANSFER NOTE
Patient: Sebastian Makisch    Procedure(s):  Laparoscopic Cholecystectomy - Wound Class: II-Clean Contaminated    Diagnosis: gallstones  Diagnosis Additional Information: No value filed.    Anesthesia Type:   General, ETT     Note:  Airway :Room Air  Patient transferred to:Phase II        Vitals: (Last set prior to Anesthesia Care Transfer)    CRNA VITALS  3/2/2017 1114 - 3/2/2017 1149      3/2/2017             Pulse: 62    SpO2: 100 %    Resp Rate (observed): 13                Electronically Signed By: LORETA Chaidez CRNA  March 2, 2017  11:49 AM

## 2017-03-02 NOTE — OP NOTE
Pre op diagnosis: Symptomatic cholelithiasis   Post op diagnosis: Same  Procedure:   Laparoscopic cholecystectomy  Anesthesia:   MYRIAM  Surgeon:   Donte  Assistant:  ROSALEE Pratt.  His assistance was needed for the camera operation and closure of the incisions.    Indication for surgery.  This is a 41 year old male with symptomatic biliary disease.  I discussed lap elma with him.  Risks and benefits were explained, including but not limited to bleeding, infection, and anesthesia. He seems to understand and consented to proceed with the procedure.     Under general anesthesia, the patient's abdomen was prepped and draped in the usual manner.  1% lidocaine was used to infiltrate above the umbilicus.  An incision was made and the veress needle was placed in the abdomen.  The abdomen was insufflated to a constant pressure and zero flow.  A 5 mm port was placed in this incision and a 5 mm 30% camera was placed.   The patient was positioned head up and right side up.  Three other ports were placed in the abdomen after infiltration of the incision sites in the right subcostal area, a 10 mm in the mid epigastric area, two 5 mm ports in the midclavicular site and the anterior axillary line.      The gallbladder was retracted superiorly and the neck retracted laterally.  The cystic duct was isolated and clips were placed.  The duct was divided with scissors.  The cystic artery was then isolated and clips were placed.  The artery was then divided with scissors.  The gallbladder was removed from the gallbladder bed using electrocautery and removed from the abdomen through the upper midline incision.    The abdomen was irrigated with saline and the fluid was evacuated.  The liver bed was inspected for hemostatis.  The upper midline port was removed and the incision was closed with a deep fascial stitch closure and all the other ports were removed under direct vision and the skin closed with 4-0 subcuticular vicryl sutures.   Dermabond was applied.  The patient tolerated the procedure well and went to PACU in stable condition.    Chaitanya Kent MD, FACS

## 2017-03-02 NOTE — DISCHARGE INSTRUCTIONS
Same Day Surgery Discharge Instructions  Special Precautions After Surgery - Adult    1. It is not unusual to feel lightheaded or faint, up to 24 hours after surgery or while taking pain medication.  If you have these symptoms; sit for a few minutes before standing and have someone assist you when getting up.  2. You should rest and relax for the next 24 hours and must have someone stay with you for at least 24 hours after your discharge.  3. DO NOT DRIVE any vehicle or operate mechanical equipment for 24 hours following the end of your surgery.  DO NOT DRIVE while taking narcotic pain medications that have been prescribed by your physician.  If you had a limb operated on, you must be able to use it fully to drive.  4. DO NOT drink alcoholic beverages for 24 hours following surgery or while taking prescription pain medication.  5. Drink clear liquids (apple juice, ginger ale, broth, 7-Up, etc.).  Progress to your regular diet as you feel able.  6. Any questions call your physician and do not make important decisions for 24 hours.          __________________________________________________________________________________________________________________________________  IMPORTANT NUMBERS:    [facility] Main Number:  [phone], [phone]  Pharmacy:  [phone]  Same Day Surgery:  [phone], Monday - Friday until 8:30 p.m.  Urgent Care:  [phone]  Emergency Room:  [phone]      [facility]:  [phone]                                                                             [facility]:  [phone] option 1  [facility]:  [phone]     [facility]:  [phone]   [facility]:  [phone]   Home Medical Equipment: [phone]  [facility]:  [phone]

## 2017-03-02 NOTE — H&P (VIEW-ONLY)
New General Surgery Consultation Note        Sebastian Hatch  [id number]  1975  February 28, 2017     [facility]    Sebastian Hatch MRN# [id number]   Age: 41 year old YOB: 1975     Date of Admission:  2/28/2017    Reason for consult: Cholecystitis       Requesting physician: Dr. Ferraro       Level of consult: One-time consult to assist in determining a diagnosis and to recommend an appropriate treatment plan           Impression and Recommendation:   Impression:   Cholecystitis      Recommendations:   Cholecystectomy, laproscopic             Chief Complaint:   Abdominal pain, right upper quadrant     History is obtained from the patient         History of Present Illness:   This patient is a 41 year old  male without a significant past medical history who presents with the following condition requiring a hospital admission:    Abdominal Pain  Patient presents for evaluation of abdominal pain. The pain is located in the RUQ with radiation to R back.  Pain is described as stabbing, and measures 5/10 in intensity.  Onset of pain was a few hours ago. Aggravating factors include eating. Alleviating factors include recumbency. Associated symptoms include nausea and bloating.            Past Medical History:   I have reviewed this patient's past medical history.  Past Medical History   Diagnosis Date     Depressive disorder      Gastro-oesophageal reflux disease      Migraine      NO ACTIVE PROBLEMS              Past Surgical History:   I have reviewed this patient's past surgical history  Past Surgical History   Procedure Laterality Date     Surgical history of -        left knee ACL replacement             Social History:   I have reviewed this patient's social history  Social History     Social History     Marital status: Single     Spouse name: N/A     Number of children: N/A     Years of education: N/A     Social History Main  Topics     Smoking status: Current Every Day Smoker     Packs/day: 1.00     Years: 10.00     Types: Cigarettes     Smokeless tobacco: Former User     Types: Chew     Alcohol use No      Comment: has not had any alchol in 5 months     Drug use: No     Sexual activity: Yes     Partners: Female     Other Topics Concern     Parent/Sibling W/ Cabg, Mi Or Angioplasty Before 65f 55m? No     Social History Narrative               Family History:   I have reviewed this patient's family history  Family History   Problem Relation Age of Onset     DIABETES Father      Hypertension Father      Unknown/Adopted Maternal Grandfather      Unknown/Adopted Paternal Grandmother      CANCER Paternal Grandmother      Respiratory Paternal Grandfather      lung cancer     C.A.D. Paternal Grandfather      Cardiovascular Sister       at 3 days, malformed aorta     Genitourinary Problems Other      kidney failure, on dialysis, unclear reason     Asthma No family hx of      CEREBROVASCULAR DISEASE No family hx of      Breast Cancer No family hx of      Prostate Cancer No family hx of      Cancer - colorectal Mother                Immunizations:     Immunization History   Administered Date(s) Administered     Influenza (IIV3) 2010     Influenza Vaccine IM 3yrs+ 4 Valent IIV4 2013, 2014     Mantoux 10/10/2011     TDAP (ADACEL AGES 11-64) 2010             Allergies:   No Known Allergies          Medications:     Current Facility-Administered Medications   Medication     HYDROmorphone (PF) (DILAUDID) injection 0.5 mg     lidocaine 1 % 1 mL     lidocaine (LMX4) kit     sodium chloride (PF) 0.9% PF flush 3 mL     sodium chloride (PF) 0.9% PF flush 3 mL     Current Outpatient Prescriptions   Medication Sig     RANITIDINE HCL PO Take 150 mg by mouth daily as needed for heartburn     HYDROcodone-acetaminophen (NORCO) 5-325 MG per tablet Take 1-2 tablets by mouth every 4 hours as needed for moderate to severe pain      naproxen 500 MG TBEC Take 500 mg by mouth 2 times daily as needed (Needs follow-up appointment for this medication)     sumatriptan (IMITREX) 50 MG tablet Take 1 tablet by mouth at onset of headache for migraine. May repeat in 2 hours if needed: max 2/day; average number of headaches monthly             Review of Systems:   C: NEGATIVE for fever, chills, change in weight  I: NEGATIVE for worrisome rashes, moles or lesions  E: NEGATIVE for vision changes or irritation  E/M: NEGATIVE for ear, mouth and throat problems  R: NEGATIVE for significant cough or SOB  B: NEGATIVE for masses, tenderness or discharge  CV: NEGATIVE for chest pain, palpitations or peripheral edema  GI: NEGATIVE for nausea, abdominal pain, heartburn, or change in bowel habits  : NEGATIVE for frequency, dysuria, or hematuria  M: NEGATIVE for significant arthralgias or myalgia  N: NEGATIVE for weakness, dizziness or paresthesias  E: NEGATIVE for temperature intolerance, skin/hair changes  H: NEGATIVE for bleeding problems  P: NEGATIVE for changes in mood or affect          Physical Exam:   Vitals were reviewed  All vitals stable  Neck:   Supple, symmetrical, trachea midline, no adenopathy, thyroid symmetric, not enlarged and no tenderness, skin normal     Chest / Breast:   Lungs clear to ascultation     Abdomen:   No scars, normal bowel sounds, soft, non-distended, non-tender, no masses palpated, no hepatosplenomegally     Musculoskeletal:   There is no redness, warmth, or swelling of the joints.  Full range of motion noted.  Motor strength is 5 out of 5 all extremities bilaterally.  Tone is normal.     Constitutional:   awake, alert, cooperative, no apparent distress, and appears stated age     Eyes:   vision intact     Lungs:   No increased work of breathing, good air exchange, clear to auscultation bilaterally, no crackles or wheezing     Cardiovascular:   Normal apical impulse, regular rate and rhythm, normal S1 and S2, no S3 or S4, and no  murmur noted     Neurologic:   Awake, alert, oriented to name, place and time.  Cranial nerves II-XII are grossly intact.  Motor is 5 out of 5 bilaterally.  Cerebellar finger to nose, heel to shin intact.  Sensory is intact.  Babinski down going, Romberg negative, and gait is normal.     Neuropsychiatric:   General: normal, calm and normal eye contact     Skin:   no bruising or bleeding, normal skin color, texture, turgor and no rashes             Data:     Results for orders placed or performed during the hospital encounter of 02/28/17 (from the past 24 hour(s))   UA reflex to Microscopic   Result Value Ref Range    Color Urine Yellow     Appearance Urine Clear     Glucose Urine Negative NEG mg/dL    Bilirubin Urine Negative NEG    Ketones Urine Negative NEG mg/dL    Specific Gravity Urine 1.019 1.003 - 1.035    Blood Urine Negative NEG    pH Urine 6.0 5.0 - 7.0 pH    Protein Albumin Urine 10 (A) NEG mg/dL    Urobilinogen mg/dL Normal 0.0 - 2.0 mg/dL    Nitrite Urine Negative NEG    Leukocyte Esterase Urine Negative NEG    Source Midstream Urine     RBC Urine 0 0 - 2 /HPF    WBC Urine <1 0 - 2 /HPF    Squamous Epithelial /HPF Urine <1 0 - 1 /HPF    Mucous Urine Present (A) NEG /LPF   CBC with platelets differential   Result Value Ref Range    WBC 8.3 4.0 - 11.0 10e9/L    RBC Count 4.42 4.4 - 5.9 10e12/L    Hemoglobin 14.0 13.3 - 17.7 g/dL    Hematocrit 41.3 40.0 - 53.0 %    MCV 93 78 - 100 fl    MCH 31.7 26.5 - 33.0 pg    MCHC 33.9 31.5 - 36.5 g/dL    RDW 12.3 10.0 - 15.0 %    Platelet Count 412 150 - 450 10e9/L    Diff Method Automated Method     % Neutrophils 61.8 %    % Lymphocytes 19.3 %    % Monocytes 14.1 %    % Eosinophils 4.0 %    % Basophils 0.6 %    % Immature Granulocytes 0.2 %    Absolute Neutrophil 5.1 1.6 - 8.3 10e9/L    Absolute Lymphocytes 1.6 0.8 - 5.3 10e9/L    Absolute Monocytes 1.2 0.0 - 1.3 10e9/L    Absolute Eosinophils 0.3 0.0 - 0.7 10e9/L    Absolute Basophils 0.1 0.0 - 0.2 10e9/L    Abs  Immature Granulocytes 0.0 0 - 0.4 10e9/L   Comprehensive metabolic panel   Result Value Ref Range    Sodium 143 133 - 144 mmol/L    Potassium 4.0 3.4 - 5.3 mmol/L    Chloride 111 (H) 94 - 109 mmol/L    Carbon Dioxide 24 20 - 32 mmol/L    Anion Gap 8 3 - 14 mmol/L    Glucose 99 70 - 99 mg/dL    Urea Nitrogen 10 7 - 30 mg/dL    Creatinine 0.75 0.66 - 1.25 mg/dL    GFR Estimate >90  Non  GFR Calc   >60 mL/min/1.7m2    GFR Estimate If Black >90   GFR Calc   >60 mL/min/1.7m2    Calcium 8.4 (L) 8.5 - 10.1 mg/dL    Bilirubin Total 0.2 0.2 - 1.3 mg/dL    Albumin 3.6 3.4 - 5.0 g/dL    Protein Total 6.7 (L) 6.8 - 8.8 g/dL    Alkaline Phosphatase 71 40 - 150 U/L    ALT 19 0 - 70 U/L    AST 10 0 - 45 U/L   Lipase   Result Value Ref Range    Lipase 155 73 - 393 U/L   Troponin I   Result Value Ref Range    Troponin I ES  0.000 - 0.045 ug/L     <0.015  The 99th percentile for upper reference range is 0.045 ug/L.  Troponin values in   the range of 0.045 - 0.120 ug/L may be associated with risks of adverse   clinical events.     US Abdomen Limited    Narrative    ULTRASOUND ABDOMEN LIMITED February 28, 2017 1:33 PM     HISTORY: Right upper quadrant pain.    FINDINGS: There is mild gallbladder distention. There is mild  gallbladder wall thickening (3.7 mm). Within the neck of the  gallbladder, there is a 7 mm nonshadowing lesion which could represent  a polyp or small sludge ball. No shadowing stones identified. Negative  sonographic Ferguson's sign. No focal hepatic lesion or common bile duct  dilatation. The pancreas is significantly obscured. 0.9 cm right renal  cyst.      Impression    IMPRESSION:   1. Mild gallbladder distention and mild wall thickening. This is  nonspecific, but could relate to cholecystitis. Clinical correlation  recommended.  2. 0.7 cm gallbladder neck lesion which may represent a polyp or  sludge.    JHONY DIAMOND MD     All imaging studies reviewed by me.     Attestation:  I have  reviewed today's vital signs, notes, medications, labs and imaging.    Dwain Kent MD

## 2017-03-02 NOTE — IP AVS SNAPSHOT
MRN:[id number]                      After Visit Summary   3/2/2017    Sebastian Hatch    MRN: [id number]           Thank you!     Thank you for choosing [facility] for your care. Our goal is always to provide you with excellent care. Hearing back from our patients is one way we can continue to improve our services. Please take a few minutes to complete the written survey that you may receive in the mail after you visit with us. Thank you!        Patient Information     Date Of Birth          1975        About your hospital stay     You were admitted on:  March 2, 2017 You last received care in the:  [facility] PreOP/Phase II    You were discharged on:  March 2, 2017       Who to Call     For medical emergencies, please call 911.  For non-urgent questions about your medical care, please call your primary care provider or clinic, [phone]  For questions related to your surgery, please call your surgery clinic        Attending Provider     Provider Specialty    Dwain Kent MD General Surgery       Primary Care Provider Office Phone # Fax #    Rosalinda Raúl Munson [phone]       [address] [address]        After Care Instructions     Diet Instructions       Resume pre-procedure diet            Discharge Instructions       Patient to follow up with appointment in 1-2 weeks            Dressing       Keep dressing clean and dry.  Dressing / incisional care as instructed by RN and or Surgeon            No Alcohol       For 24 hours post procedure            No driving or operating machinery        until the day after procedure            Shower       No shower for 24 hours post procedure. May shower Postoperative Day (POD)  1            Weight bearing status - As tolerated                 Further instructions from your care team                           Same Day Surgery Discharge Instructions  Special Precautions After  Surgery - Adult    1. It is not unusual to feel lightheaded or faint, up to 24 hours after surgery or while taking pain medication.  If you have these symptoms; sit for a few minutes before standing and have someone assist you when getting up.  2. You should rest and relax for the next 24 hours and must have someone stay with you for at least 24 hours after your discharge.  3. DO NOT DRIVE any vehicle or operate mechanical equipment for 24 hours following the end of your surgery.  DO NOT DRIVE while taking narcotic pain medications that have been prescribed by your physician.  If you had a limb operated on, you must be able to use it fully to drive.  4. DO NOT drink alcoholic beverages for 24 hours following surgery or while taking prescription pain medication.  5. Drink clear liquids (apple juice, ginger ale, broth, 7-Up, etc.).  Progress to your regular diet as you feel able.  6. Any questions call your physician and do not make important decisions for 24 hours.          __________________________________________________________________________________________________________________________________  IMPORTANT NUMBERS:    [facility] Main Number:  [phone], [phone]  Pharmacy:  [phone]  Same Day Surgery:  [phone], Monday - Friday until 8:30 p.m.  Urgent Care:  [phone]  Emergency Room:  [phone]      [facility]:  [phone]                                                                             [facility]:  [phone] option 1  [facility]:  [phone]     [facility]:  [phone]   [facility] Clinic:  [phone]   Home Medical Equipment: [phone]  [facility]:  [phone]        Pending Results     No orders found from 2/28/2017 to 3/3/2017.            Admission Information     Date & Time Provider Department Dept. Phone    3/2/2017 Dwain Kent MD [facility] PreOP/Phase -[phone]      Your  Vitals Were     Blood Pressure Pulse Temperature Respirations Height Weight    144/90 68 97.7  F (36.5  C) (Oral) 16 1.829 m (6') 77.6 kg (171 lb)    Pulse Oximetry BMI (Body Mass Index)                98% 23.19 kg/m2          Care EveryWhere ID     This is your Care EveryWhere ID. This could be used by other organizations to access your [facility] medical records  [id number]           Review of your medicines      START taking        Dose / Directions    ondansetron 4 MG ODT tab   Commonly known as:  ZOFRAN-ODT   Used for:  Post-op pain        Dose:  4-8 mg   Take 1-2 tablets (4-8 mg) by mouth every 8 hours as needed for nausea Dissolve ON the tongue.   Quantity:  20 tablet   Refills:  0         CONTINUE these medicines which may have CHANGED, or have new prescriptions. If we are uncertain of the size of tablets/capsules you have at home, strength may be listed as something that might have changed.        Dose / Directions    * HYDROcodone-acetaminophen 5-325 MG per tablet   Commonly known as:  NORCO   This may have changed:  Another medication with the same name was added. Make sure you understand how and when to take each.        Dose:  1-2 tablet   Take 1-2 tablets by mouth every 4 hours as needed for moderate to severe pain   Quantity:  15 tablet   Refills:  0       * HYDROcodone-acetaminophen 5-325 MG per tablet   Commonly known as:  NORCO   This may have changed:  You were already taking a medication with the same name, and this prescription was added. Make sure you understand how and when to take each.   Used for:  Post-op pain        Dose:  1-2 tablet   Take 1-2 tablets by mouth every 4 hours as needed for other (Moderate to Severe Pain)   Quantity:  30 tablet   Refills:  0       * Notice:  This list has 2 medication(s) that are the same as other medications prescribed for you. Read the directions carefully, and ask your doctor or other care provider to review them with you.      CONTINUE these medicines  which have NOT CHANGED        Dose / Directions    naproxen 500 MG Tbec   Indication:  Vascular Headache   Used for:  Other chronic pain        Dose:  500 mg   Take 500 mg by mouth 2 times daily as needed (Needs follow-up appointment for this medication)   Quantity:  60 tablet   Refills:  0       RANITIDINE HCL PO        Dose:  150 mg   Take 150 mg by mouth daily as needed for heartburn   Refills:  0       SUMAtriptan 50 MG tablet   Commonly known as:  IMITREX   Used for:  Migraine headache        Dose:  50 mg   Take 1 tablet by mouth at onset of headache for migraine. May repeat in 2 hours if needed: max 2/day; average number of headaches monthly   Quantity:  9 tablet   Refills:  3            Where to get your medicines      These medications were sent to [facility] [place], [place] - [address]  [address] [address]     Phone:  [phone]     ondansetron 4 MG ODT tab         Some of these will need a paper prescription and others can be bought over the counter. Ask your nurse if you have questions.     Bring a paper prescription for each of these medications     HYDROcodone-acetaminophen 5-325 MG per tablet                Protect others around you: Learn how to safely use, store and throw away your medicines at www.disposemymeds.org.             Medication List: This is a list of all your medications and when to take them. Check marks below indicate your daily home schedule. Keep this list as a reference.      Medications           Morning Afternoon Evening Bedtime As Needed    * HYDROcodone-acetaminophen 5-325 MG per tablet   Commonly known as:  NORCO   Take 1-2 tablets by mouth every 4 hours as needed for moderate to severe pain                                * HYDROcodone-acetaminophen 5-325 MG per tablet   Commonly known as:  NORCO   Take 1-2 tablets by mouth every 4 hours as needed for other (Moderate to Severe Pain)                                naproxen 500 MG Tbec    Take 500 mg by mouth 2 times daily as needed (Needs follow-up appointment for this medication)                                ondansetron 4 MG ODT tab   Commonly known as:  ZOFRAN-ODT   Take 1-2 tablets (4-8 mg) by mouth every 8 hours as needed for nausea Dissolve ON the tongue.                                RANITIDINE HCL PO   Take 150 mg by mouth daily as needed for heartburn                                SUMAtriptan 50 MG tablet   Commonly known as:  IMITREX   Take 1 tablet by mouth at onset of headache for migraine. May repeat in 2 hours if needed: max 2/day; average number of headaches monthly                                * Notice:  This list has 2 medication(s) that are the same as other medications prescribed for you. Read the directions carefully, and ask your doctor or other care provider to review them with you.

## 2017-03-02 NOTE — ANESTHESIA POSTPROCEDURE EVALUATION
Patient: Sebastian Hatch    Procedure(s):  Laparoscopic Cholecystectomy - Wound Class: II-Clean Contaminated    Diagnosis:gallstones  Diagnosis Additional Information: No value filed.    Anesthesia Type:  General, ETT    Note:  Anesthesia Post Evaluation    Patient location during evaluation: Bedside  Patient participation: Able to fully participate in evaluation  Level of consciousness: awake and alert  Pain management: adequate  Airway patency: patent  Cardiovascular status: acceptable  Respiratory status: acceptable  Hydration status: acceptable  PONV: none     Anesthetic complications: None          Last vitals:  Vitals:    03/02/17 1250 03/02/17 1307 03/02/17 1325   BP:  144/90 144/70   Pulse:  68    Resp:  16 16   Temp:      SpO2: 95% 98% 94%         Electronically Signed By: Real Stone CRNA, APRN CRNA  March 2, 2017  5:23 PM

## 2017-03-02 NOTE — ANESTHESIA PREPROCEDURE EVALUATION
Anesthesia Evaluation     . Pt has had prior anesthetic. Type: General    History of anesthetic complications  - PONV    ROS/MED HX    ENT/Pulmonary:     (+)tobacco use, Current use .5 packs/day  , . .    Neurologic:     (+)migraines,     Cardiovascular:  - neg cardiovascular ROS       METS/Exercise Tolerance:     Hematologic:  - neg hematologic  ROS       Musculoskeletal:  - neg musculoskeletal ROS       GI/Hepatic:     (+) GERD Other, Other GI/Hepatic ETOH Abuse      Renal/Genitourinary:  - ROS Renal section negative       Endo:  - neg endo ROS       Psychiatric:     (+) psychiatric history depression      Infectious Disease:  - neg infectious disease ROS       Malignancy:      - no malignancy   Other:    - neg other ROS           Physical Exam  Normal systems: cardiovascular, pulmonary and dental    Airway   Mallampati: I  TM distance: >3 FB  Neck ROM: full    Dental     Cardiovascular       Pulmonary                     Anesthesia Plan      History & Physical Review  History and physical reviewed and following examination; no interval change.    ASA Status:  2 .    NPO Status:  > 8 hours    Plan for General and ETT with Intravenous induction. Maintenance will be TIVA.    PONV prophylaxis:  Ondansetron (or other 5HT-3) and Dexamethasone or Solumedrol  Additional equipment: Videolaryngoscope      Postoperative Care  Postoperative pain management:  IV analgesics and Oral pain medications.      Consents  Anesthetic plan, risks, benefits and alternatives discussed with:  Patient.  Use of blood products discussed: No .   .                          .

## 2017-03-06 LAB — COPATH REPORT: NORMAL

## 2017-03-15 ENCOUNTER — OFFICE VISIT (OUTPATIENT)
Dept: SURGERY | Facility: CLINIC | Age: 42
End: 2017-03-15

## 2017-03-15 VITALS
TEMPERATURE: 97.9 F | SYSTOLIC BLOOD PRESSURE: 150 MMHG | RESPIRATION RATE: 16 BRPM | HEART RATE: 62 BPM | DIASTOLIC BLOOD PRESSURE: 98 MMHG

## 2017-03-15 DIAGNOSIS — G89.18 POST-OP PAIN: Primary | ICD-10-CM

## 2017-03-15 DIAGNOSIS — K81.9 CHOLECYSTITIS: ICD-10-CM

## 2017-03-15 PROCEDURE — 99024 POSTOP FOLLOW-UP VISIT: CPT | Performed by: SURGERY

## 2017-03-15 RX ORDER — HYDROCODONE BITARTRATE AND ACETAMINOPHEN 5; 325 MG/1; MG/1
1-2 TABLET ORAL EVERY 6 HOURS PRN
Qty: 30 TABLET | Refills: 0 | Status: SHIPPED | OUTPATIENT
Start: 2017-03-15 | End: 2018-06-02

## 2017-03-15 NOTE — MR AVS SNAPSHOT
After Visit Summary   3/15/2017    Sebastian Hatch    MRN: [id number]           Patient Information     Date Of Birth          1975        Visit Information        Provider Department      3/15/2017 11:15 AM David Johnson MD [facility]        Today's Diagnoses     Post-op pain    -  1    Cholecystitis           Follow-ups after your visit        Who to contact     If you have questions or need follow up information about today's clinic visit or your schedule please contact [facility] directly at [phone].  Normal or non-critical lab and imaging results will be communicated to you by MyChart, letter or phone within 4 business days after the clinic has received the results. If you do not hear from us within 7 days, please contact the clinic through MyChart or phone. If you have a critical or abnormal lab result, we will notify you by phone as soon as possible.  Submit refill requests through [employer] or call your pharmacy and they will forward the refill request to us. Please allow 3 business days for your refill to be completed.          Additional Information About Your Visit        Care EveryWhere ID     This is your Care EveryWhere ID. This could be used by other organizations to access your [facility] medical records  [id number]        Your Vitals Were     Pulse Temperature Respirations             62 97.9  F (36.6  C) 16          Blood Pressure from Last 3 Encounters:   03/15/17 (!) 150/98   03/02/17 144/70   02/28/17 (!) 143/99    Weight from Last 3 Encounters:   03/02/17 77.6 kg (171 lb)   09/25/14 77.6 kg (171 lb)   12/17/13 82.9 kg (182 lb 12.8 oz)              Today, you had the following     No orders found for display         Today's Medication Changes          These changes are accurate as of: 3/15/17 11:32 AM.  If you have any questions, ask your nurse or doctor.               These medicines have changed or have updated prescriptions.         Dose/Directions    * HYDROcodone-acetaminophen 5-325 MG per tablet   Commonly known as:  NORCO   This may have changed:  Another medication with the same name was added. Make sure you understand how and when to take each.        Dose:  1-2 tablet   Take 1-2 tablets by mouth every 4 hours as needed for moderate to severe pain   Quantity:  15 tablet   Refills:  0       * HYDROcodone-acetaminophen 5-325 MG per tablet   Commonly known as:  NORCO   This may have changed:  Another medication with the same name was added. Make sure you understand how and when to take each.   Used for:  Post-op pain        Dose:  1-2 tablet   Take 1-2 tablets by mouth every 4 hours as needed for other (Moderate to Severe Pain)   Quantity:  30 tablet   Refills:  0       * HYDROcodone-acetaminophen 5-325 MG per tablet   Commonly known as:  NORCO   This may have changed:  You were already taking a medication with the same name, and this prescription was added. Make sure you understand how and when to take each.   Used for:  Post-op pain        Dose:  1-2 tablet   Take 1-2 tablets by mouth every 6 hours as needed   Quantity:  30 tablet   Refills:  0       * Notice:  This list has 3 medication(s) that are the same as other medications prescribed for you. Read the directions carefully, and ask your doctor or other care provider to review them with you.         Where to get your medicines      Some of these will need a paper prescription and others can be bought over the counter.  Ask your nurse if you have questions.     Bring a paper prescription for each of these medications     HYDROcodone-acetaminophen 5-325 MG per tablet                Primary Care Provider Office Phone # Fax #    Rosalinda Munson [phone]       [place] [id number] [facility] [place] [address]        Thank you!     Thank you for choosing [facility]  for your care. Our goal is always to provide you with excellent care.  Hearing back from our patients is one way we can continue to improve our services. Please take a few minutes to complete the written survey that you may receive in the mail after your visit with us. Thank you!             Your Updated Medication List - Protect others around you: Learn how to safely use, store and throw away your medicines at www.disposemymeds.org.          This list is accurate as of: 3/15/17 11:32 AM.  Always use your most recent med list.                   Brand Name Dispense Instructions for use    * HYDROcodone-acetaminophen 5-325 MG per tablet    NORCO    15 tablet    Take 1-2 tablets by mouth every 4 hours as needed for moderate to severe pain       * HYDROcodone-acetaminophen 5-325 MG per tablet    NORCO    30 tablet    Take 1-2 tablets by mouth every 4 hours as needed for other (Moderate to Severe Pain)       * HYDROcodone-acetaminophen 5-325 MG per tablet    NORCO    30 tablet    Take 1-2 tablets by mouth every 6 hours as needed       naproxen 500 MG Tbec     60 tablet    Take 500 mg by mouth 2 times daily as needed (Needs follow-up appointment for this medication)       ondansetron 4 MG ODT tab    ZOFRAN-ODT    20 tablet    Take 1-2 tablets (4-8 mg) by mouth every 8 hours as needed for nausea Dissolve ON the tongue.       RANITIDINE HCL PO      Take 150 mg by mouth daily as needed for heartburn       SUMAtriptan 50 MG tablet    IMITREX    9 tablet    Take 1 tablet by mouth at onset of headache for migraine. May repeat in 2 hours if needed: max 2/day; average number of headaches monthly       * Notice:  This list has 3 medication(s) that are the same as other medications prescribed for you. Read the directions carefully, and ask your doctor or other care provider to review them with you.

## 2017-03-15 NOTE — NURSING NOTE
Initial BP (!) 150/98 (BP Location: Left arm, Patient Position: Chair, Cuff Size: Adult Large)  Pulse 62  Resp 16 Estimated body mass index is 23.19 kg/(m^2) as calculated from the following:    Height as of 3/2/17: 1.829 m (6').    Weight as of 3/2/17: 77.6 kg (171 lb). .

## 2017-03-30 ENCOUNTER — OFFICE VISIT (OUTPATIENT)
Dept: SURGERY | Facility: CLINIC | Age: 42
End: 2017-03-30

## 2017-03-30 VITALS
HEART RATE: 66 BPM | HEIGHT: 72 IN | SYSTOLIC BLOOD PRESSURE: 154 MMHG | TEMPERATURE: 99.3 F | WEIGHT: 171 LBS | BODY MASS INDEX: 23.16 KG/M2 | DIASTOLIC BLOOD PRESSURE: 91 MMHG

## 2017-03-30 DIAGNOSIS — G89.18 POST-OP PAIN: ICD-10-CM

## 2017-03-30 DIAGNOSIS — Z09 SURGERY FOLLOW-UP EXAMINATION: Primary | ICD-10-CM

## 2017-03-30 PROCEDURE — 99024 POSTOP FOLLOW-UP VISIT: CPT | Performed by: SURGERY

## 2017-03-30 RX ORDER — HYDROCODONE BITARTRATE AND ACETAMINOPHEN 5; 325 MG/1; MG/1
1-2 TABLET ORAL EVERY 4 HOURS PRN
Qty: 30 TABLET | Refills: 0 | Status: SHIPPED | OUTPATIENT
Start: 2017-03-30 | End: 2018-06-02

## 2017-03-30 NOTE — TELEPHONE ENCOUNTER
Please call pt when this is ready for p/u at .   Still having pain especially while sleeping     Arielle Alexis  Specialty CSS

## 2017-03-30 NOTE — TELEPHONE ENCOUNTER
"VORB from Dr Kent is \"if this level of pain this far out from surgery then she wishes to see patient in clinic to evaluate.\"    Patient advised and wishes to be seen.  Patient unable to be here for a long period of time and therefore advised to come right over and we will attempt to see ASAP in between other scheduled patients.    Deya Wilson RN  [facility]      "

## 2017-03-30 NOTE — NURSING NOTE
Initial BP (!) 154/91 (BP Location: Right arm, Patient Position: Chair, Cuff Size: Adult Regular)  Pulse 66  Temp 99.3  F (37.4  C) (Oral)  Ht 1.829 m (6')  Wt 77.6 kg (171 lb)  BMI 23.19 kg/m2 Estimated body mass index is 23.19 kg/(m^2) as calculated from the following:    Height as of this encounter: 1.829 m (6').    Weight as of this encounter: 77.6 kg (171 lb). .    Shirlene Cline MA

## 2017-03-30 NOTE — TELEPHONE ENCOUNTER
"Discussed with patient that if still having a level of pain that requires Pain med (moderate to severe pain) instead of reduced with Advil/Tylenol this far out post op;    (almost 1 month post op)   then needs to be assessed in clinic to investigate why.    Reviewed with patient that he was seen on 15th with Dr Johnson and was stated healing well, no heavy lifting for 2 weeks and provided another 30 tabs of Vicodin.     Per patient he was told by Dr Johnson that Dr Johnson would provide him in addition to the one given at that visit,  even 1 more refill if he called back and needing one.    Advised Dr Johnson is not in clinic nor is he the on call today,   and that Dr Kent (his Surgeon)  Is in clinic and on call and she would want to see in clinic to determine \"if still having severe post op pain then Why?  or is it something else that perhaps PCP needs to see and determine if any other prolonged pain as the cause\".    Patient began a long statement about his surgery involved the diaphragm and how long it took him to heal before when it was just his broken ribs,  but now this surgery involves a long time to heal and especially when he is in so much pain he cannot heal when he cannot sleep.  Etc.    RN reiterated that Dr Kent will most likely not provide one over the phone without evaluating in clinic but will inquire with Dr Kent.    Message being sent to Dr Powers.    Deya Wilson RN  [facility]          "

## 2017-03-30 NOTE — MR AVS SNAPSHOT
After Visit Summary   3/30/2017    Sebastian Hatch    MRN: [id number]           Patient Information     Date Of Birth          1975        Visit Information        Provider Department      3/30/2017 1:30 PM Dwain Kent MD [facility]        Today's Diagnoses     Surgery follow-up examination    -  1      Care Instructions    Per Dr. Kent's instructions        Follow-ups after your visit        Who to contact     If you have questions or need follow up information about today's clinic visit or your schedule please contact [facility] directly at [phone].  Normal or non-critical lab and imaging results will be communicated to you by MyChart, letter or phone within 4 business days after the clinic has received the results. If you do not hear from us within 7 days, please contact the clinic through MyChart or phone. If you have a critical or abnormal lab result, we will notify you by phone as soon as possible.  Submit refill requests through [employer] or call your pharmacy and they will forward the refill request to us. Please allow 3 business days for your refill to be completed.          Additional Information About Your Visit        Care EveryWhere ID     This is your Care EveryWhere ID. This could be used by other organizations to access your [facility] medical records  [id number]        Your Vitals Were     Pulse Temperature Height BMI (Body Mass Index)          66 99.3  F (37.4  C) (Oral) 1.829 m (6') 23.19 kg/m2         Blood Pressure from Last 3 Encounters:   03/30/17 (!) 154/91   03/15/17 (!) 150/98   03/02/17 144/70    Weight from Last 3 Encounters:   03/30/17 77.6 kg (171 lb)   03/02/17 77.6 kg (171 lb)   09/25/14 77.6 kg (171 lb)              Today, you had the following     No orders found for display         Where to get your medicines      Some of these will need a paper prescription and others can be bought over the counter.  Ask your nurse if  you have questions.     Bring a paper prescription for each of these medications     HYDROcodone-acetaminophen 5-325 MG per tablet          Primary Care Provider Office Phone # Fax #    Rosalinda Munson [phone]       [address] [address]        Thank you!     Thank you for choosing [facility]  for your care. Our goal is always to provide you with excellent care. Hearing back from our patients is one way we can continue to improve our services. Please take a few minutes to complete the written survey that you may receive in the mail after your visit with us. Thank you!             Your Updated Medication List - Protect others around you: Learn how to safely use, store and throw away your medicines at www.disposemymeds.org.          This list is accurate as of: 3/30/17  4:49 PM.  Always use your most recent med list.                   Brand Name Dispense Instructions for use    * HYDROcodone-acetaminophen 5-325 MG per tablet    NORCO    15 tablet    Take 1-2 tablets by mouth every 4 hours as needed for moderate to severe pain       * HYDROcodone-acetaminophen 5-325 MG per tablet    NORCO    30 tablet    Take 1-2 tablets by mouth every 6 hours as needed       * HYDROcodone-acetaminophen 5-325 MG per tablet    NORCO    30 tablet    Take 1-2 tablets by mouth every 4 hours as needed for other (Moderate to Severe Pain)       naproxen 500 MG Tbec     60 tablet    Take 500 mg by mouth 2 times daily as needed (Needs follow-up appointment for this medication)       ondansetron 4 MG ODT tab    ZOFRAN-ODT    20 tablet    Take 1-2 tablets (4-8 mg) by mouth every 8 hours as needed for nausea Dissolve ON the tongue.       RANITIDINE HCL PO      Take 150 mg by mouth daily as needed for heartburn       SUMAtriptan 50 MG tablet    IMITREX    9 tablet    Take 1 tablet by mouth at onset of headache for migraine. May repeat in 2 hours if needed: max 2/day;  average number of headaches monthly       * Notice:  This list has 3 medication(s) that are the same as other medications prescribed for you. Read the directions carefully, and ask your doctor or other care provider to review them with you.

## 2017-03-30 NOTE — PROGRESS NOTES
Pt returns to see me for more narcotics for pain from a laparoscopic cholecystectomy.  He is having incisional pain after a long day of work.  He had the surgery 4 weeks ago and had one refill of pain medication already.  He states that he had to return to work right after surgery and feels that it is taking longer to heal, and that is why he is still having pain.  He is eating and having BM's, denies nausea or vomiting.    On examination:  His incisions are all well healed.    A/P:  Incisional pain.  I gave him a refill of pain medications.  I don't think he should need a refill of this again after he runs out, since he is only taking it at night for pain, and he should be well healed in 2 weeks.    Chaitanya Kent MD, FACS

## 2017-12-10 ENCOUNTER — HOSPITAL ENCOUNTER (EMERGENCY)
Facility: CLINIC | Age: 42
Discharge: HOME | End: 2017-12-10
Attending: EMERGENCY MEDICINE | Admitting: EMERGENCY MEDICINE

## 2017-12-10 VITALS
OXYGEN SATURATION: 99 % | SYSTOLIC BLOOD PRESSURE: 140 MMHG | HEART RATE: 88 BPM | TEMPERATURE: 97.9 F | RESPIRATION RATE: 16 BRPM | HEIGHT: 72 IN | DIASTOLIC BLOOD PRESSURE: 102 MMHG

## 2017-12-10 DIAGNOSIS — Z51.89 ENCOUNTER FOR WOUND RE-CHECK: ICD-10-CM

## 2017-12-10 PROCEDURE — 99282 EMERGENCY DEPT VISIT SF MDM: CPT | Performed by: EMERGENCY MEDICINE

## 2017-12-10 PROCEDURE — 87077 CULTURE AEROBIC IDENTIFY: CPT | Performed by: EMERGENCY MEDICINE

## 2017-12-10 PROCEDURE — 25000132 ZZH RX MED GY IP 250 OP 250 PS 637: Performed by: EMERGENCY MEDICINE

## 2017-12-10 PROCEDURE — 87186 SC STD MICRODIL/AGAR DIL: CPT | Performed by: EMERGENCY MEDICINE

## 2017-12-10 PROCEDURE — 87070 CULTURE OTHR SPECIMN AEROBIC: CPT | Performed by: EMERGENCY MEDICINE

## 2017-12-10 PROCEDURE — 99282 EMERGENCY DEPT VISIT SF MDM: CPT | Mod: Z6 | Performed by: EMERGENCY MEDICINE

## 2017-12-10 RX ORDER — ACETAMINOPHEN 500 MG
1000 TABLET ORAL ONCE
Status: COMPLETED | OUTPATIENT
Start: 2017-12-10 | End: 2017-12-10

## 2017-12-10 RX ORDER — OXYCODONE HYDROCHLORIDE 5 MG/1
10 TABLET ORAL ONCE
Status: COMPLETED | OUTPATIENT
Start: 2017-12-10 | End: 2017-12-10

## 2017-12-10 RX ADMIN — ACETAMINOPHEN 1000 MG: 500 TABLET ORAL at 21:47

## 2017-12-10 RX ADMIN — OXYCODONE HYDROCHLORIDE 10 MG: 5 TABLET ORAL at 21:47

## 2017-12-10 NOTE — ED AVS SNAPSHOT
[facility] Emergency Department    [address] [address]    Phone:  [phone]    Fax:  [phone]                                       Sebastian Hatch   MRN: [id number]    Department:  [facility] Emergency Department   Date of Visit:  12/10/2017           Patient Information     Date Of Birth          1975        Your diagnoses for this visit were:     Encounter for wound re-check        You were seen by Antoni Almazan MD.        Discharge Instructions       Take acetaminophen and ibuprofen for pain.  Return to the emergency department if you have worsening swelling, pain, spreading redness or warmth or discharge.  Otherwise follow-up in clinic.    24 Hour Appointment Hotline       To make an appointment at any [facility] clinic, call [phone] ([phone]). If you don't have a family doctor or clinic, we will help you find one. [facility] clinics are conveniently located to serve the needs of you and your family.             Review of your medicines      Our records show that you are taking the medicines listed below. If these are incorrect, please call your family doctor or clinic.        Dose / Directions Last dose taken    * HYDROcodone-acetaminophen 5-325 MG per tablet   Commonly known as:  NORCO   Dose:  1-2 tablet   Quantity:  15 tablet        Take 1-2 tablets by mouth every 4 hours as needed for moderate to severe pain   Refills:  0        * HYDROcodone-acetaminophen 5-325 MG per tablet   Commonly known as:  NORCO   Dose:  1-2 tablet   Quantity:  30 tablet        Take 1-2 tablets by mouth every 6 hours as needed   Refills:  0        * HYDROcodone-acetaminophen 5-325 MG per tablet   Commonly known as:  NORCO   Dose:  1-2 tablet   Quantity:  30 tablet        Take 1-2 tablets by mouth every 4 hours as needed for other (Moderate to Severe Pain)   Refills:  0        naproxen 500 MG Tbec   Dose:  500 mg   Indication:  Vascular Headache   Quantity:  60 tablet         Take 500 mg by mouth 2 times daily as needed (Needs follow-up appointment for this medication)   Refills:  0        ondansetron 4 MG ODT tab   Commonly known as:  ZOFRAN-ODT   Dose:  4-8 mg   Quantity:  20 tablet        Take 1-2 tablets (4-8 mg) by mouth every 8 hours as needed for nausea Dissolve ON the tongue.   Refills:  0        RANITIDINE HCL PO   Dose:  150 mg        Take 150 mg by mouth daily as needed for heartburn   Refills:  0        SUMAtriptan 50 MG tablet   Commonly known as:  IMITREX   Dose:  50 mg   Quantity:  9 tablet        Take 1 tablet by mouth at onset of headache for migraine. May repeat in 2 hours if needed: max 2/day; average number of headaches monthly   Refills:  3        * Notice:  This list has 3 medication(s) that are the same as other medications prescribed for you. Read the directions carefully, and ask your doctor or other care provider to review them with you.            Procedures and tests performed during your visit     Wound Culture Aerobic Bacterial      Orders Needing Specimen Collection     None      Pending Results     Date and Time Order Name Status Description    12/10/2017 2131 Wound Culture Aerobic Bacterial In process             Pending Culture Results     Date and Time Order Name Status Description    12/10/2017 2131 Wound Culture Aerobic Bacterial In process             Pending Results Instructions     If you had any lab results that were not finalized at the time of your Discharge, you can call the ED Lab Result RN at [phone]. You will be contacted by this team for any positive Lab results or changes in treatment. The nurses are available 7 days a week from 10A to 6:30P.  You can leave a message 24 hours per day and they will return your call.        Test Results From Your Hospital Stay        12/10/2017  9:54 PM                Thank you for choosing [facility]       Thank you for choosing [facility] for your care. Our goal is always to provide you with excellent  "care. Hearing back from our patients is one way we can continue to improve our services. Please take a few minutes to complete the written survey that you may receive in the mail after you visit with us. Thank you!        [facility] Information     [facility] lets you send messages to your doctor, view your test results, renew your prescriptions, schedule appointments and more. To sign up, go to [email or web address] . Click on \"Log in\" on the left side of the screen, which will take you to the Welcome page. Then click on \"Sign up Now\" on the right side of the page.     You will be asked to enter the access code listed below, as well as some personal information. Please follow the directions to create your username and password.     Your access code is: [email or web address]  Expires: 3/10/2018 10:34 PM     Your access code will  in 90 days. If you need help or a new code, please call your [facility] clinic or [phone].        Care EveryWhere ID     This is your Care EveryWhere ID. This could be used by other organizations to access your [facility] medical records  [id number]        Equal Access to Services     [facility] : Hadii prerna Jack, rosalba isabel, amanda gallegos, giorgio rogel . So [facility] [phone].    ATENCIÓN: Si habla español, tiene a bird disposición servicios gratuitos de asistencia lingüística. Llame al [phone].    We comply with applicable federal civil rights laws and [place] laws. We do not discriminate on the basis of race, color, national origin, age, disability, sex, sexual orientation, or gender identity.            After Visit Summary       This is your record. Keep this with you and show to your community pharmacist(s) and doctor(s) at your next visit.                  "

## 2017-12-10 NOTE — ED AVS SNAPSHOT
[facility]    [address] [address]    Phone:  [phone]    Fax:  [phone]                                       Sebastian Hatch   MRN: [id number]    Department:  [facility]   Date of Visit:  12/10/2017           After Visit Summary Signature Page     I have received my discharge instructions, and my questions have been answered. I have discussed any challenges I see with this plan with the nurse or doctor.    ..........................................................................................................................................  Patient/Patient Representative Signature      ..........................................................................................................................................  Patient Representative Print Name and Relationship to Patient    ..................................................               ................................................  Date                                            Time    ..........................................................................................................................................  Reviewed by Signature/Title    ...................................................              ..............................................  Date                                                            Time
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
## 2017-12-11 NOTE — DISCHARGE INSTRUCTIONS
Take acetaminophen and ibuprofen for pain.  Return to the emergency department if you have worsening swelling, pain, spreading redness or warmth or discharge.  Otherwise follow-up in clinic.
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
## 2017-12-13 ENCOUNTER — TELEPHONE (OUTPATIENT)
Dept: EMERGENCY MEDICINE | Facility: CLINIC | Age: 42
End: 2017-12-13

## 2017-12-13 LAB
BACTERIA SPEC CULT: ABNORMAL
SPECIMEN SOURCE: ABNORMAL
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
## 2018-06-02 ENCOUNTER — HOSPITAL ENCOUNTER (EMERGENCY)
Facility: CLINIC | Age: 43
Discharge: HOME | End: 2018-06-02
Attending: PHYSICIAN ASSISTANT | Admitting: PHYSICIAN ASSISTANT

## 2018-06-02 ENCOUNTER — APPOINTMENT (OUTPATIENT)
Dept: GENERAL RADIOLOGY | Facility: CLINIC | Age: 43
End: 2018-06-02
Attending: PHYSICIAN ASSISTANT

## 2018-06-02 VITALS
TEMPERATURE: 98.4 F | HEART RATE: 79 BPM | RESPIRATION RATE: 16 BRPM | OXYGEN SATURATION: 98 % | HEIGHT: 72 IN | DIASTOLIC BLOOD PRESSURE: 60 MMHG | SYSTOLIC BLOOD PRESSURE: 151 MMHG

## 2018-06-02 DIAGNOSIS — M20.021 BOUTONNIERE DEFORMITY OF FINGER, RIGHT: Primary | ICD-10-CM

## 2018-06-02 PROCEDURE — 73140 X-RAY EXAM OF FINGER(S): CPT | Mod: RT

## 2018-06-02 PROCEDURE — G0463 HOSPITAL OUTPT CLINIC VISIT: HCPCS | Performed by: PHYSICIAN ASSISTANT

## 2018-06-02 PROCEDURE — 99213 OFFICE O/P EST LOW 20 MIN: CPT | Mod: Z6 | Performed by: PHYSICIAN ASSISTANT

## 2018-06-02 ASSESSMENT — ENCOUNTER SYMPTOMS
NEUROLOGICAL NEGATIVE: 1
CONSTITUTIONAL NEGATIVE: 1

## 2018-06-02 NOTE — ED AVS SNAPSHOT
[facility] Emergency Department    [address] [address]    Phone:  [phone]    Fax:  [phone]                                       Sebastian Hatch   MRN: [id number]    Department:  [facility] Emergency Department   Date of Visit:  6/2/2018           Patient Information     Date Of Birth          1975        Your diagnoses for this visit were:     Boutonniere deformity of finger, right        You were seen by Zoraida Vickers PA-C.      Follow-up Information     Call [facility].    Why:  For follow-up    Contact information:    [facility] [facility] [id number] [phone]          Follow up with [facility] Emergency Department.    Specialty:  EMERGENCY MEDICINE    Why:  As needed, If symptoms worsen    Contact information:    [address] [place] [id number] [phone]    Additional information:    The medical center is located at   [address] (between [address] in [place], four miles north   of [place]).      24 Hour Appointment Hotline       To make an appointment at any [facility] clinic, call [phone] ([phone]). If you don't have a family doctor or clinic, we will help you find one. [facility] clinics are conveniently located to serve the needs of you and your family.          ED Discharge Orders     ORTHO  REFERRAL       [facility] is referring you to the Orthopedic  Services at [facility].       The [facility] Representative will assist you in the coordination of your Orthopedic and Musculoskeletal Care as prescribed by your physician.    The  Representative will call you within 1 business day to help schedule your appointment, or you may contact the  Representative at:    All areas ~ [phone]     Type of Referral : Surgical / Specialist       Timeframe requested: 3 - 5 days    Coverage of  these services is subject to the terms and limitations of your health insurance plan.  Please call member services at your health plan with any benefit or coverage questions.      If X-rays, CT or MRI's have been performed, please contact the facility where they were done to arrange for , prior to your scheduled appointment.  Please bring this referral request to your appointment and present it to your specialist.                     Review of your medicines      Notice     You have not been prescribed any medications.            Procedures and tests performed during your visit     XR Finger Right G/E 2 Views      Orders Needing Specimen Collection     None      Pending Results     Date and Time Order Name Status Description    6/2/2018 1856 XR Finger Right G/E 2 Views Preliminary             Pending Culture Results     No orders found from 5/31/2018 to 6/3/2018.            Pending Results Instructions     If you had any lab results that were not finalized at the time of your Discharge, you can call the ED Lab Result RN at [phone]. You will be contacted by this team for any positive Lab results or changes in treatment. The nurses are available 7 days a week from 10A to 6:30P.  You can leave a message 24 hours per day and they will return your call.        Test Results From Your Hospital Stay        6/2/2018  7:18 PM      Narrative     FINGER(S) TWO-THREE VIEWS RIGHT  6/2/2018 7:16 PM     HISTORY: jammed right index finger, tenderness to DIP joint and  limited ROM;     COMPARISON: None.    FINDINGS: There is normal osseous alignment.  No fractures are  identified.        Impression     IMPRESSION: Osseous structures appear intact. No fractures are  identified.                Thank you for choosing [facility]       Thank you for choosing [facility] for your care. Our goal is always to provide you with excellent care. Hearing back from our patients is one way we can continue to improve our services. Please take  "a few minutes to complete the written survey that you may receive in the mail after you visit with us. Thank you!        [employer] Information     "[employer]" lets you send messages to your doctor, view your test results, renew your prescriptions, schedule appointments and more. To sign up, go to [email or web address]" . Click on \"Log in\" on the left side of the screen, which will take you to the Welcome page. Then click on \"Sign up Now\" on the right side of the page.     You will be asked to enter the access code listed below, as well as some personal information. Please follow the directions to create your username and password.     Your access code is: [email or web address]  Expires: 2018  7:25 PM     Your access code will  in 90 days. If you need help or a new code, please call your [facility] clinic or [phone].        Care EveryWhere ID     This is your Care EveryWhere ID. This could be used by other organizations to access your [facility] medical records  [id number]        Equal Access to Services     [facility]: Hadii prerna nina Soshanonn, waaxda luqadaha, qaybta kaalmarenetta gallegos, giorgio rogel . [facility] [phone].    ATENCIÓN: Si habla español, tiene a bird disposición servicios gratuitos de asistencia lingüística. Llame al [phone].    We comply with applicable federal civil rights laws and [place] laws. We do not discriminate on the basis of race, color, national origin, age, disability, sex, sexual orientation, or gender identity.            After Visit Summary       This is your record. Keep this with you and show to your community pharmacist(s) and doctor(s) at your next visit.                  "

## 2018-06-02 NOTE — ED AVS SNAPSHOT
[facility] Emergency Department    [address] [address]    Phone:  [phone]    Fax:  [phone]                                       Sebastian Hatch   MRN: [id number]    Department:  [facility] Emergency Department   Date of Visit:  6/2/2018           After Visit Summary Signature Page     I have received my discharge instructions, and my questions have been answered. I have discussed any challenges I see with this plan with the nurse or doctor.    ..........................................................................................................................................  Patient/Patient Representative Signature      ..........................................................................................................................................  Patient Representative Print Name and Relationship to Patient    ..................................................               ................................................  Date                                            Time    ..........................................................................................................................................  Reviewed by Signature/Title    ...................................................              ..............................................  Date                                                            Time

## 2018-06-02 NOTE — ED TRIAGE NOTES
Patient here for R index finger issues, symptoms started today - NKI.  Patient presents ambulatory to the urgent care.
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
## 2019-05-04 ENCOUNTER — HOSPITAL ENCOUNTER (EMERGENCY)
Facility: CLINIC | Age: 44
Discharge: HOME | End: 2019-05-04
Attending: EMERGENCY MEDICINE | Admitting: EMERGENCY MEDICINE

## 2019-05-04 VITALS
HEART RATE: 80 BPM | RESPIRATION RATE: 16 BRPM | BODY MASS INDEX: 23.03 KG/M2 | WEIGHT: 170 LBS | TEMPERATURE: 97.8 F | DIASTOLIC BLOOD PRESSURE: 97 MMHG | SYSTOLIC BLOOD PRESSURE: 160 MMHG | HEIGHT: 72 IN | OXYGEN SATURATION: 97 %

## 2019-05-04 DIAGNOSIS — K08.89 PAIN, DENTAL: ICD-10-CM

## 2019-05-04 PROCEDURE — 99284 EMERGENCY DEPT VISIT MOD MDM: CPT | Mod: Z6 | Performed by: EMERGENCY MEDICINE

## 2019-05-04 PROCEDURE — 99282 EMERGENCY DEPT VISIT SF MDM: CPT | Performed by: EMERGENCY MEDICINE

## 2019-05-04 RX ORDER — OXYCODONE AND ACETAMINOPHEN 5; 325 MG/1; MG/1
1-2 TABLET ORAL EVERY 4 HOURS PRN
Qty: 6 TABLET | Refills: 0 | Status: SHIPPED | OUTPATIENT
Start: 2019-05-04 | End: 2019-09-16

## 2019-05-04 RX ORDER — PENICILLIN V POTASSIUM 500 MG/1
500 TABLET, FILM COATED ORAL 4 TIMES DAILY
Qty: 40 TABLET | Refills: 0 | Status: SHIPPED | OUTPATIENT
Start: 2019-05-04 | End: 2019-09-16

## 2019-05-04 ASSESSMENT — MIFFLIN-ST. JEOR: SCORE: 1704.11

## 2019-05-04 NOTE — ED AVS SNAPSHOT
[facility] Emergency Department  [address] [facility] [address]  Phone:  [phone]  Fax:  [phone]                                    Sebastian Hatch   MRN: [id number]    Department:  [facility] Emergency Department   Date of Visit:  5/4/2019           After Visit Summary Signature Page    I have received my discharge instructions, and my questions have been answered. I have discussed any challenges I see with this plan with the nurse or doctor.    ..........................................................................................................................................  Patient/Patient Representative Signature      ..........................................................................................................................................  Patient Representative Print Name and Relationship to Patient    ..................................................               ................................................  Date                                   Time    ..........................................................................................................................................  Reviewed by Signature/Title    ...................................................              ..............................................  Date                                               Time          22EPIC Rev 08/18

## 2019-05-05 NOTE — ED NOTES
States 2 years ago was told he needed a root canal but didn't have it done. Now it's hurting again. Thinks it might be infected. Left lower jaw

## 2019-05-05 NOTE — ED TRIAGE NOTES
"Tooth stared hurting 3 days ago states \"im at my breaking point\"  Took ibuprofen last dose 400mg at 11am  "

## 2019-05-06 NOTE — ED PROVIDER NOTES
History     Chief Complaint   Patient presents with     Dental Pain     l lower     HPI  Sebastian Hatch is a 43 year old male who presents the emergency department complaining of right lower molar dental pain.  Patient states 2 years ago he was told he needed a root canal to this tooth but did not have it done due to insurance purposes.  He has not had any problems with the tooth since until 3 days ago when he began having increasing pain to the tooth.  He also had some gingival swelling.  The pain is worse with eating.  He currently rates his pain a 5 out of 10.  Patient states he is concerned there is an infection.  He called the dentist and they told him that he probably needed to be on antibiotics before having anything done.  They told him to come to the emergency department.  Patient has had tried ibuprofen for the pain without much improvement.  He denies any fevers or chills.  He is not having any difficulty swallowing.  He denies any shortness of breath.  He has not had any rash and denies any focal numbness weakness any extremity.    Allergies:  Allergies   Allergen Reactions     Nka [No Known Allergies]        Problem List:    Patient Active Problem List    Diagnosis Date Noted     MENTAL HEALTH 11/22/2013     Priority: Medium     Alcohol abuse 05/31/2012     Priority: Medium     Shoulder and upper arm, superficial foreign body (splinter) 08/18/2011     Priority: Medium     Problem list name updated by automated process. Provider to review       GERD (gastroesophageal reflux disease) 03/24/2011     Priority: Medium     Moderate major depression (H) 03/24/2011     Priority: Medium     Migraine headache 03/24/2011     Priority: Medium     (Problem list name updated by automated process. Provider to review and confirm.)       Tobacco abuse 11/05/2010     Priority: Medium     CARDIOVASCULAR SCREENING; LDL GOAL LESS THAN 160 10/31/2010     Priority: Medium        Past Medical History:    Past Medical History:    Diagnosis Date     Depressive disorder      Gastro-oesophageal reflux disease      Migraine      NO ACTIVE PROBLEMS        Past Surgical History:    Past Surgical History:   Procedure Laterality Date     LAPAROSCOPIC CHOLECYSTECTOMY N/A 3/2/2017    Procedure: LAPAROSCOPIC CHOLECYSTECTOMY;  Surgeon: Dwain Kent MD;  Location: [place] OR     SURGICAL HISTORY OF -       left knee ACL replacement       Family History:    Family History   Problem Relation Age of Onset     Diabetes Father      Hypertension Father      Unknown/Adopted Maternal Grandfather      Unknown/Adopted Paternal Grandmother      Cancer Paternal Grandmother      Respiratory Paternal Grandfather         lung cancer     C.A.D. Paternal Grandfather      Cardiovascular Sister          at 3 days, malformed aorta     Genitourinary Problems Other         kidney failure, on dialysis, unclear reason     Cancer - colorectal Mother      Asthma No family hx of      Cerebrovascular Disease No family hx of      Breast Cancer No family hx of      Prostate Cancer No family hx of        Social History:  Marital Status:  Single [1]  Social History     Tobacco Use     Smoking status: Current Every Day Smoker     Packs/day: 1.00     Years: 10.00     Pack years: 10.00     Types: Cigarettes     Smokeless tobacco: Former User     Types: Chew   Substance Use Topics     Alcohol use: No     Comment: has not had any alchol in 5 months     Drug use: No        Medications:      oxyCODONE-acetaminophen (PERCOCET) 5-325 MG tablet   penicillin V (VEETID) 500 MG tablet         Review of Systems  As per HPI.  Physical Exam   BP: (!) 168/107  Pulse: 78  Temp: 97.8  F (36.6  C)  Resp: 18  Height: 182.9 cm (6')  Weight: 77.1 kg (170 lb)  SpO2: 97 %      Physical Exam   Constitutional: He appears well-nourished.   Mild dental distress.   HENT:   Oral mucosa moist several carious teeth noted right lower first molar has filling in place tender to palpation of the tooth mild  lower gingival swelling no evidence of abscess.  No buccal mucosal swelling.  Posterior pharynx without erythema edema.   Eyes: Conjunctivae are normal.   Neck: Normal range of motion. Neck supple.   Pulmonary/Chest: Effort normal.   Musculoskeletal: Normal range of motion.   Neurological: He is alert.   Skin: Skin is dry.   Psychiatric: He has a normal mood and affect.   Vitals reviewed.      ED Course        Procedures          Critical Care time:  none                 Assessments & Plan (with Medical Decision Making) records were reviewed.   was reviewed.  Findings discussed with patient.  I plan to treat the patient with Lori PERKINS and give him a few pain pills.  He understands he needs to find a dentist as soon as possible.  He will return if any increased swelling pain or other symptoms present.  There is no evidence of abscess at this time.     I have reviewed the nursing notes.    I have reviewed the findings, diagnosis, plan and need for follow up with the patient.          Medication List      Started    oxyCODONE-acetaminophen 5-325 MG tablet  Commonly known as:  PERCOCET  1-2 tablets, Oral, EVERY 4 HOURS PRN     penicillin V 500 MG tablet  Commonly known as:  VEETID  500 mg, Oral, 4 TIMES DAILY            Final diagnoses:   Pain, dental       5/4/2019   [facility] EMERGENCY DEPARTMENT     Narciso Swartz MD  05/06/19 [id number]

## 2019-09-16 ENCOUNTER — HOSPITAL ENCOUNTER (EMERGENCY)
Facility: CLINIC | Age: 44
Discharge: HOME | End: 2019-09-16
Attending: EMERGENCY MEDICINE | Admitting: EMERGENCY MEDICINE

## 2019-09-16 ENCOUNTER — APPOINTMENT (OUTPATIENT)
Dept: GENERAL RADIOLOGY | Facility: CLINIC | Age: 44
End: 2019-09-16
Attending: EMERGENCY MEDICINE

## 2019-09-16 VITALS
TEMPERATURE: 98.1 F | OXYGEN SATURATION: 98 % | HEART RATE: 66 BPM | RESPIRATION RATE: 11 BRPM | HEIGHT: 72 IN | BODY MASS INDEX: 23.03 KG/M2 | DIASTOLIC BLOOD PRESSURE: 87 MMHG | SYSTOLIC BLOOD PRESSURE: 130 MMHG | WEIGHT: 170 LBS

## 2019-09-16 DIAGNOSIS — R07.9 ACUTE CHEST PAIN: ICD-10-CM

## 2019-09-16 LAB
ALBUMIN SERPL-MCNC: 4.4 G/DL (ref 3.4–5)
ALP SERPL-CCNC: 71 U/L (ref 40–150)
ALT SERPL W P-5'-P-CCNC: 26 U/L (ref 0–70)
ANION GAP SERPL CALCULATED.3IONS-SCNC: 6 MMOL/L (ref 3–14)
AST SERPL W P-5'-P-CCNC: 20 U/L (ref 0–45)
BASOPHILS # BLD AUTO: 0.1 10E9/L (ref 0–0.2)
BASOPHILS NFR BLD AUTO: 0.6 %
BILIRUB SERPL-MCNC: 0.4 MG/DL (ref 0.2–1.3)
BUN SERPL-MCNC: 11 MG/DL (ref 7–30)
CALCIUM SERPL-MCNC: 9.1 MG/DL (ref 8.5–10.1)
CHLORIDE SERPL-SCNC: 108 MMOL/L (ref 94–109)
CO2 SERPL-SCNC: 27 MMOL/L (ref 20–32)
CREAT SERPL-MCNC: 0.9 MG/DL (ref 0.66–1.25)
DIFFERENTIAL METHOD BLD: NORMAL
EOSINOPHIL # BLD AUTO: 0.1 10E9/L (ref 0–0.7)
EOSINOPHIL NFR BLD AUTO: 0.8 %
ERYTHROCYTE [DISTWIDTH] IN BLOOD BY AUTOMATED COUNT: 12.5 % (ref 10–15)
GFR SERPL CREATININE-BSD FRML MDRD: >90 ML/MIN/{1.73_M2}
GLUCOSE SERPL-MCNC: 86 MG/DL (ref 70–99)
HCT VFR BLD AUTO: 43.1 % (ref 40–53)
HGB BLD-MCNC: 14.7 G/DL (ref 13.3–17.7)
IMM GRANULOCYTES # BLD: 0 10E9/L (ref 0–0.4)
IMM GRANULOCYTES NFR BLD: 0.2 %
LYMPHOCYTES # BLD AUTO: 1.6 10E9/L (ref 0.8–5.3)
LYMPHOCYTES NFR BLD AUTO: 19.2 %
MCH RBC QN AUTO: 32.5 PG (ref 26.5–33)
MCHC RBC AUTO-ENTMCNC: 34.1 G/DL (ref 31.5–36.5)
MCV RBC AUTO: 95 FL (ref 78–100)
MONOCYTES # BLD AUTO: 0.9 10E9/L (ref 0–1.3)
MONOCYTES NFR BLD AUTO: 10.3 %
NEUTROPHILS # BLD AUTO: 5.8 10E9/L (ref 1.6–8.3)
NEUTROPHILS NFR BLD AUTO: 68.9 %
NRBC # BLD AUTO: 0 10*3/UL
NRBC BLD AUTO-RTO: 0 /100
PLATELET # BLD AUTO: 427 10E9/L (ref 150–450)
POTASSIUM SERPL-SCNC: 4.1 MMOL/L (ref 3.4–5.3)
PROT SERPL-MCNC: 7.5 G/DL (ref 6.8–8.8)
RBC # BLD AUTO: 4.52 10E12/L (ref 4.4–5.9)
SODIUM SERPL-SCNC: 141 MMOL/L (ref 133–144)
TROPONIN I SERPL-MCNC: <0.015 UG/L (ref 0–0.04)
TROPONIN I SERPL-MCNC: <0.015 UG/L (ref 0–0.04)
WBC # BLD AUTO: 8.5 10E9/L (ref 4–11)

## 2019-09-16 PROCEDURE — 80053 COMPREHEN METABOLIC PANEL: CPT | Performed by: EMERGENCY MEDICINE

## 2019-09-16 PROCEDURE — 71046 X-RAY EXAM CHEST 2 VIEWS: CPT

## 2019-09-16 PROCEDURE — 25000128 H RX IP 250 OP 636: Performed by: EMERGENCY MEDICINE

## 2019-09-16 PROCEDURE — 93010 ELECTROCARDIOGRAM REPORT: CPT | Mod: Z6 | Performed by: EMERGENCY MEDICINE

## 2019-09-16 PROCEDURE — 25000132 ZZH RX MED GY IP 250 OP 250 PS 637: Performed by: EMERGENCY MEDICINE

## 2019-09-16 PROCEDURE — 93005 ELECTROCARDIOGRAM TRACING: CPT | Mod: 76 | Performed by: EMERGENCY MEDICINE

## 2019-09-16 PROCEDURE — 99285 EMERGENCY DEPT VISIT HI MDM: CPT | Mod: 25 | Performed by: EMERGENCY MEDICINE

## 2019-09-16 PROCEDURE — 96374 THER/PROPH/DIAG INJ IV PUSH: CPT | Performed by: EMERGENCY MEDICINE

## 2019-09-16 PROCEDURE — 84484 ASSAY OF TROPONIN QUANT: CPT | Performed by: EMERGENCY MEDICINE

## 2019-09-16 PROCEDURE — 93005 ELECTROCARDIOGRAM TRACING: CPT | Performed by: EMERGENCY MEDICINE

## 2019-09-16 PROCEDURE — 85025 COMPLETE CBC W/AUTO DIFF WBC: CPT | Performed by: EMERGENCY MEDICINE

## 2019-09-16 RX ORDER — NITROGLYCERIN 0.4 MG/1
0.4 TABLET SUBLINGUAL EVERY 5 MIN PRN
Status: DISCONTINUED | OUTPATIENT
Start: 2019-09-16 | End: 2019-09-16 | Stop reason: HOSPADM

## 2019-09-16 RX ORDER — LORAZEPAM 2 MG/ML
1 INJECTION INTRAMUSCULAR ONCE
Status: COMPLETED | OUTPATIENT
Start: 2019-09-16 | End: 2019-09-16

## 2019-09-16 RX ORDER — NAPROXEN 500 MG/1
500 TABLET ORAL 2 TIMES DAILY
COMMUNITY
Start: 2019-05-20

## 2019-09-16 RX ORDER — ASPIRIN 81 MG/1
324 TABLET, CHEWABLE ORAL ONCE
Status: COMPLETED | OUTPATIENT
Start: 2019-09-16 | End: 2019-09-16

## 2019-09-16 RX ADMIN — ASPIRIN 81 MG 324 MG: 81 TABLET ORAL at 13:20

## 2019-09-16 RX ADMIN — NITROGLYCERIN 0.4 MG: 0.4 TABLET SUBLINGUAL at 13:25

## 2019-09-16 RX ADMIN — LORAZEPAM 1 MG: 2 INJECTION INTRAMUSCULAR; INTRAVENOUS at 13:40

## 2019-09-16 ASSESSMENT — MIFFLIN-ST. JEOR: SCORE: 1699.11

## 2019-09-16 NOTE — DISCHARGE INSTRUCTIONS
Return to the emergency department for worsening pain, difficulty breathing, repeated vomiting, or other concerns.  Otherwise follow-up in cardiology clinic.

## 2019-09-16 NOTE — ED AVS SNAPSHOT
[facility] Emergency Department  [address] [facility] [address]  Phone:  [phone]  Fax:  [phone]                                    Sebastian Hatch   MRN: [id number]    Department:  [facility] Emergency Department   Date of Visit:  9/16/2019           After Visit Summary Signature Page    I have received my discharge instructions, and my questions have been answered. I have discussed any challenges I see with this plan with the nurse or doctor.    ..........................................................................................................................................  Patient/Patient Representative Signature      ..........................................................................................................................................  Patient Representative Print Name and Relationship to Patient    ..................................................               ................................................  Date                                   Time    ..........................................................................................................................................  Reviewed by Signature/Title    ...................................................              ..............................................  Date                                               Time          22EPIC Rev 08/18

## 2019-09-16 NOTE — ED PROVIDER NOTES
History     Chief Complaint   Patient presents with     Chest Pain     and shortness of breath started just after waking up this am     HPI  Sebastian Hatch is a 44 year old male who presents for chest pain.  Symptoms started several hours prior to arrival, described as sharp cramping over his chest, rated as moderate, radiates to the left shoulder.  He reports shortness of breath and diaphoresis.  Nausea but no vomiting.  He has not taking medicine for this.  He denies fever, chills, abdominal pain, diarrhea, dysuria, or rash.    Allergies:  Allergies   Allergen Reactions     Nka [No Known Allergies]        Problem List:    Patient Active Problem List    Diagnosis Date Noted     MENTAL HEALTH 11/22/2013     Priority: Medium     Alcohol abuse 05/31/2012     Priority: Medium     Shoulder and upper arm, superficial foreign body (splinter) 08/18/2011     Priority: Medium     Problem list name updated by automated process. Provider to review       GERD (gastroesophageal reflux disease) 03/24/2011     Priority: Medium     Moderate major depression (H) 03/24/2011     Priority: Medium     Migraine headache 03/24/2011     Priority: Medium     (Problem list name updated by automated process. Provider to review and confirm.)       Tobacco abuse 11/05/2010     Priority: Medium     CARDIOVASCULAR SCREENING; LDL GOAL LESS THAN 160 10/31/2010     Priority: Medium        Past Medical History:    Past Medical History:   Diagnosis Date     Depressive disorder      Gastro-oesophageal reflux disease      Migraine      NO ACTIVE PROBLEMS        Past Surgical History:    Past Surgical History:   Procedure Laterality Date     LAPAROSCOPIC CHOLECYSTECTOMY N/A 3/2/2017    Procedure: LAPAROSCOPIC CHOLECYSTECTOMY;  Surgeon: Dwain Kent MD;  Location: [place] OR     SURGICAL HISTORY OF -       left knee ACL replacement       Family History:    Family History   Problem Relation Age of Onset     Diabetes Father      Hypertension Father       Unknown/Adopted Maternal Grandfather      Unknown/Adopted Paternal Grandmother      Cancer Paternal Grandmother      Respiratory Paternal Grandfather         lung cancer     C.A.D. Paternal Grandfather      Cardiovascular Sister          at 3 days, malformed aorta     Genitourinary Problems Other         kidney failure, on dialysis, unclear reason     Cancer - colorectal Mother      Asthma No family hx of      Cerebrovascular Disease No family hx of      Breast Cancer No family hx of      Prostate Cancer No family hx of        Social History:  Marital Status:  Single [1]  Social History     Tobacco Use     Smoking status: Current Every Day Smoker     Packs/day: 1.00     Years: 10.00     Pack years: 10.00     Types: Cigarettes     Smokeless tobacco: Former User     Types: Chew   Substance Use Topics     Alcohol use: No     Comment: has not had any alchol in 5 months     Drug use: No        Medications:      oxyCODONE-acetaminophen (PERCOCET) 5-325 MG tablet         Review of Systems  Pertinent positives and negatives listed in the HPI, all other systems reviewed and are negative.    Physical Exam   BP: (!) 147/93  Heart Rate: 76  Temp: 98.1  F (36.7  C)  Resp: 16  Height: 182.9 cm (6')  Weight: 77.1 kg (170 lb)  SpO2: 100 %      Physical Exam   Constitutional: He is oriented to person, place, and time. He appears well-developed and well-nourished. He appears distressed.   HENT:   Head: Normocephalic and atraumatic.   Right Ear: External ear normal.   Left Ear: External ear normal.   Nose: Nose normal.   Eyes: Conjunctivae are normal. No scleral icterus.   Neck: Normal range of motion.   Cardiovascular: Normal rate and regular rhythm.   Pulses:       Radial pulses are 2+ on the right side, and 2+ on the left side.        Posterior tibial pulses are 2+ on the right side, and 2+ on the left side.   Pulmonary/Chest: Effort normal. No stridor. No respiratory distress. He has no wheezes.   Abdominal: Soft. He  exhibits no distension.   Musculoskeletal:        Right lower leg: He exhibits no tenderness and no edema.        Left lower leg: He exhibits no tenderness and no edema.   Neurological: He is alert and oriented to person, place, and time.   Skin: Skin is warm and dry. He is not diaphoretic.   Psychiatric: He has a normal mood and affect. His behavior is normal.   Nursing note and vitals reviewed.      ED Course        Procedures               EKG Interpretation:      Interpreted by Antoni Almazan MD  Time reviewed: 1315  Symptoms at time of EKG: Chest pain   Rhythm: normal sinus   Rate: normal  Axis: normal  Ectopy: none  Conduction: normal  ST Segments/ T Waves: No ST-T wave changes  Q Waves: none  Comparison to prior: Unchanged    Clinical Impression: normal EKG    Critical Care time:  none               Results for orders placed or performed during the hospital encounter of 09/16/19 (from the past 24 hour(s))   CBC with platelets differential   Result Value Ref Range    WBC 8.5 4.0 - 11.0 10e9/L    RBC Count 4.52 4.4 - 5.9 10e12/L    Hemoglobin 14.7 13.3 - 17.7 g/dL    Hematocrit 43.1 40.0 - 53.0 %    MCV 95 78 - 100 fl    MCH 32.5 26.5 - 33.0 pg    MCHC 34.1 31.5 - 36.5 g/dL    RDW 12.5 10.0 - 15.0 %    Platelet Count 427 150 - 450 10e9/L    Diff Method Automated Method     % Neutrophils 68.9 %    % Lymphocytes 19.2 %    % Monocytes 10.3 %    % Eosinophils 0.8 %    % Basophils 0.6 %    % Immature Granulocytes 0.2 %    Nucleated RBCs 0 0 /100    Absolute Neutrophil 5.8 1.6 - 8.3 10e9/L    Absolute Lymphocytes 1.6 0.8 - 5.3 10e9/L    Absolute Monocytes 0.9 0.0 - 1.3 10e9/L    Absolute Eosinophils 0.1 0.0 - 0.7 10e9/L    Absolute Basophils 0.1 0.0 - 0.2 10e9/L    Abs Immature Granulocytes 0.0 0 - 0.4 10e9/L    Absolute Nucleated RBC 0.0    Troponin I   Result Value Ref Range    Troponin I ES <0.015 0.000 - 0.045 ug/L   Comprehensive metabolic panel   Result Value Ref Range    Sodium 141 133 - 144 mmol/L     Potassium 4.1 3.4 - 5.3 mmol/L    Chloride 108 94 - 109 mmol/L    Carbon Dioxide 27 20 - 32 mmol/L    Anion Gap 6 3 - 14 mmol/L    Glucose 86 70 - 99 mg/dL    Urea Nitrogen 11 7 - 30 mg/dL    Creatinine 0.90 0.66 - 1.25 mg/dL    GFR Estimate >90 >60 mL/min/[1.73_m2]    GFR Estimate If Black >90 >60 mL/min/[1.73_m2]    Calcium 9.1 8.5 - 10.1 mg/dL    Bilirubin Total 0.4 0.2 - 1.3 mg/dL    Albumin 4.4 3.4 - 5.0 g/dL    Protein Total 7.5 6.8 - 8.8 g/dL    Alkaline Phosphatase 71 40 - 150 U/L    ALT 26 0 - 70 U/L    AST 20 0 - 45 U/L   XR Chest 2 Views    Narrative    CHEST TWO VIEWS September 16, 2019 2:08 PM     HISTORY: Chest pain.    COMPARISON: 11/12/2014.      Impression    IMPRESSION: No acute abnormality. Lungs are well-inflated. Right lung  nodule is unchanged, consistent with benign etiology. Right upper lobe  lung nodule is unchanged, consistent with benign etiology. Heart size  is normal.    THADDEUS SIMON MD       Medications   nitroGLYcerin (NITROSTAT) sublingual tablet 0.4 mg (0.4 mg Sublingual Given 9/16/19 1325)   aspirin (ASA) chewable tablet 324 mg (324 mg Oral Given 9/16/19 1320)   LORazepam (ATIVAN) injection 1 mg (1 mg Intravenous Given 9/16/19 1340)       Assessments & Plan (with Medical Decision Making)   44-year-old male presents for chest pain.  Heart 76, temperature is 98.1  F, SPO2 100% in room air.  EKG sinus rhythm without signs of ischemia or dysrhythmia.  Troponin is normal.  Elect lites are within normal limits.  White blood cell count 8.5 which is reassuring.  Repeat EKG is normal.  Patient was given aspirin and nitroglycerin for symptoms with resolution of the chest pain.  Lorazepam given for the patient's anxiety and he developed some tetany while he was here this resolved with this.  Chest x-ray obtained, images reviewed independently as well as radiology read reviewed, no signs of pneumothorax, infiltrate to suggest pneumonia, or widened mediastinum.  The patient is feeling better  now and is asymptomatic here.  He is currently awaiting repeat troponin and is signed out to Dr. Swartz at change of shift.  If this is normal he is likely safe to discharge to home with instructions to return if worse, otherwise follow-up in clinic for recheck.    I have reviewed the nursing notes.    I have reviewed the findings, diagnosis, plan and need for follow up with the patient.       New Prescriptions    No medications on file       Final diagnoses:   Acute chest pain       9/16/2019   [facility] EMERGENCY DEPARTMENT     Antoni Almazan MD  09/16/19 5711

## 2019-09-16 NOTE — ED PROVIDER NOTES
Emergency Department Patient Sign-out       Brief HPI:  This is a 44 year old male signed out to me by Dr. Almazan .  See initial ED Provider note for details of the presentation.     Patient was signed out to me for follow up with repeat troponin and monitoring for any further chest pain. Troponin returned within normal limits and patient remained chest pain free. Patient was informed of findings by myself and he feels comfortable going home at this point and following up per Dr. Almazan.       Exam:   Patient Vitals for the past 24 hrs:   BP Temp Temp src Pulse Heart Rate Resp SpO2 Height Weight   09/16/19 1600 130/83 -- -- 69 -- -- 97 % -- --   09/16/19 1545 (!) 131/94 -- -- 90 -- -- 98 % -- --   09/16/19 1530 124/85 -- -- 66 -- -- 98 % -- --   09/16/19 1515 (!) 127/92 -- -- 62 -- -- 98 % -- --   09/16/19 1500 (!) 124/90 -- -- 63 -- -- 97 % -- --   09/16/19 1445 (!) 131/91 -- -- 63 -- -- 97 % -- --   09/16/19 1430 (!) 129/91 -- -- 66 -- -- 97 % -- --   09/16/19 1400 -- -- -- -- 71 11 95 % -- --   09/16/19 1345 (!) 134/92 -- -- 60 69 -- 100 % -- --   09/16/19 1330 128/84 -- -- 62 69 27 97 % -- --   09/16/19 1315 (!) 146/113 -- -- 68 68 23 100 % -- --   09/16/19 1304 (!) 147/93 98.1  F (36.7  C) Oral -- 76 16 100 % 1.829 m (6') 77.1 kg (170 lb)           ED RESULTS:   Results for orders placed or performed during the hospital encounter of 09/16/19 (from the past 24 hour(s))   CBC with platelets differential     Status: None    Collection Time: 09/16/19  1:17 PM   Result Value Ref Range    WBC 8.5 4.0 - 11.0 10e9/L    RBC Count 4.52 4.4 - 5.9 10e12/L    Hemoglobin 14.7 13.3 - 17.7 g/dL    Hematocrit 43.1 40.0 - 53.0 %    MCV 95 78 - 100 fl    MCH 32.5 26.5 - 33.0 pg    MCHC 34.1 31.5 - 36.5 g/dL    RDW 12.5 10.0 - 15.0 %    Platelet Count 427 150 - 450 10e9/L    Diff Method Automated Method     % Neutrophils 68.9 %    % Lymphocytes 19.2 %    % Monocytes 10.3 %    % Eosinophils 0.8 %    % Basophils 0.6 %    %  Immature Granulocytes 0.2 %    Nucleated RBCs 0 0 /100    Absolute Neutrophil 5.8 1.6 - 8.3 10e9/L    Absolute Lymphocytes 1.6 0.8 - 5.3 10e9/L    Absolute Monocytes 0.9 0.0 - 1.3 10e9/L    Absolute Eosinophils 0.1 0.0 - 0.7 10e9/L    Absolute Basophils 0.1 0.0 - 0.2 10e9/L    Abs Immature Granulocytes 0.0 0 - 0.4 10e9/L    Absolute Nucleated RBC 0.0    Troponin I     Status: None    Collection Time: 09/16/19  1:17 PM   Result Value Ref Range    Troponin I ES <0.015 0.000 - 0.045 ug/L   Comprehensive metabolic panel     Status: None    Collection Time: 09/16/19  1:17 PM   Result Value Ref Range    Sodium 141 133 - 144 mmol/L    Potassium 4.1 3.4 - 5.3 mmol/L    Chloride 108 94 - 109 mmol/L    Carbon Dioxide 27 20 - 32 mmol/L    Anion Gap 6 3 - 14 mmol/L    Glucose 86 70 - 99 mg/dL    Urea Nitrogen 11 7 - 30 mg/dL    Creatinine 0.90 0.66 - 1.25 mg/dL    GFR Estimate >90 >60 mL/min/[1.73_m2]    GFR Estimate If Black >90 >60 mL/min/[1.73_m2]    Calcium 9.1 8.5 - 10.1 mg/dL    Bilirubin Total 0.4 0.2 - 1.3 mg/dL    Albumin 4.4 3.4 - 5.0 g/dL    Protein Total 7.5 6.8 - 8.8 g/dL    Alkaline Phosphatase 71 40 - 150 U/L    ALT 26 0 - 70 U/L    AST 20 0 - 45 U/L   XR Chest 2 Views     Status: None    Collection Time: 09/16/19  2:08 PM    Narrative    CHEST TWO VIEWS September 16, 2019 2:08 PM     HISTORY: Chest pain.    COMPARISON: 11/12/2014.      Impression    IMPRESSION: No acute abnormality. Lungs are well-inflated. Right lung  nodule is unchanged, consistent with benign etiology. Right upper lobe  lung nodule is unchanged, consistent with benign etiology. Heart size  is normal.    THADDEUS SIMON MD   Troponin I     Status: None    Collection Time: 09/16/19  4:02 PM   Result Value Ref Range    Troponin I ES <0.015 0.000 - 0.045 ug/L       ED MEDICATIONS:   Medications   nitroGLYcerin (NITROSTAT) sublingual tablet 0.4 mg (0.4 mg Sublingual Given 9/16/19 1325)   aspirin (ASA) chewable tablet 324 mg (324 mg Oral Given  9/16/19 1320)   LORazepam (ATIVAN) injection 1 mg (1 mg Intravenous Given 9/16/19 1340)         Impression:    ICD-10-CM    1. Acute chest pain R07.9 Troponin I       Plan:    Pt. Is discharged home in stable condition with discharge per Dr. Almazan.       MD Sheridan Del Rosario David Charles, MD  09/17/19 4610

## 2021-05-03 NOTE — PROGRESS NOTES
No complaints.  Pain controlled with oral pain meds.    Smoking Intervention:  Patient was counseled today on the ill effects of smoking and it's effects on wound healing as well as other post-surgical morbidities.    BP (!) 150/98 (BP Location: Left arm, Patient Position: Chair, Cuff Size: Adult Large)  Pulse 62  Temp 97.9  F (36.6  C)  Resp 16    Exam  SPE4PHF  CTAB  RRR  S&NTND+BS, wounds - cdi s erythema  No CCE    A/P s/p lap elma healing well.  No heavy lifting for 2 weeks.  RTC prn.    David Johnson MD   
10

## 2021-06-04 ENCOUNTER — APPOINTMENT (OUTPATIENT)
Dept: GENERAL RADIOLOGY | Facility: CLINIC | Age: 46
End: 2021-06-04
Attending: PHYSICIAN ASSISTANT

## 2021-06-04 ENCOUNTER — HOSPITAL ENCOUNTER (EMERGENCY)
Facility: CLINIC | Age: 46
Discharge: HOME | End: 2021-06-04
Attending: PHYSICIAN ASSISTANT | Admitting: PHYSICIAN ASSISTANT

## 2021-06-04 VITALS
OXYGEN SATURATION: 98 % | SYSTOLIC BLOOD PRESSURE: 156 MMHG | HEART RATE: 75 BPM | BODY MASS INDEX: 23.03 KG/M2 | RESPIRATION RATE: 20 BRPM | TEMPERATURE: 98.6 F | WEIGHT: 170 LBS | DIASTOLIC BLOOD PRESSURE: 102 MMHG | HEIGHT: 72 IN

## 2021-06-04 DIAGNOSIS — S62.344A CLOSED NONDISPLACED FRACTURE OF BASE OF FOURTH METACARPAL BONE OF RIGHT HAND, INITIAL ENCOUNTER: ICD-10-CM

## 2021-06-04 PROCEDURE — G0463 HOSPITAL OUTPT CLINIC VISIT: HCPCS | Mod: 25 | Performed by: PHYSICIAN ASSISTANT

## 2021-06-04 PROCEDURE — 73130 X-RAY EXAM OF HAND: CPT | Mod: RT

## 2021-06-04 PROCEDURE — 99213 OFFICE O/P EST LOW 20 MIN: CPT | Mod: 25 | Performed by: PHYSICIAN ASSISTANT

## 2021-06-04 PROCEDURE — 26600 TREAT METACARPAL FRACTURE: CPT | Mod: RT | Performed by: PHYSICIAN ASSISTANT

## 2021-06-04 PROCEDURE — 26600 TREAT METACARPAL FRACTURE: CPT | Mod: 54 | Performed by: PHYSICIAN ASSISTANT

## 2021-06-04 RX ORDER — HYDROCODONE BITARTRATE AND ACETAMINOPHEN 5; 325 MG/1; MG/1
1 TABLET ORAL EVERY 6 HOURS PRN
Qty: 10 TABLET | Refills: 0 | Status: SHIPPED | OUTPATIENT
Start: 2021-06-04 | End: 2021-06-07

## 2021-06-04 ASSESSMENT — ENCOUNTER SYMPTOMS: CONSTITUTIONAL NEGATIVE: 1

## 2021-06-04 ASSESSMENT — MIFFLIN-ST. JEOR: SCORE: 1694.11

## 2021-06-04 NOTE — LETTER
June 4, 2021      To Whom It May Concern:      Sebastian Hatch was seen in our Emergency Department today, 06/04/21.  Please allow patient to wear splint on right hand as he has a hand fracture.  Pt should not use his right hand to lift or work.  He will follow-up with orthopedics.  Thank you.      Sincerely,        Zoraida Vickers PA-C

## 2021-06-05 NOTE — ED PROVIDER NOTES
History     Chief Complaint   Patient presents with     Hand Injury     punched a wall.     SABINO Hatch is a 45 year old male who presents with complaints of right hand pain and swelling since he punched a wall just prior to arrival.  Patient states he became angry with someone and instead of punching them, punched the wall.  Patient has had pain and swelling to the dorsum of his right hand since that time.  He is right-hand dominant.  He states he has prior history of boxer's fracture in this right hand years ago.        Allergies:  Allergies   Allergen Reactions     Nka [No Known Allergies]        Problem List:    Patient Active Problem List    Diagnosis Date Noted     MENTAL HEALTH 11/22/2013     Priority: Medium     Alcohol abuse 05/31/2012     Priority: Medium     Shoulder and upper arm, superficial foreign body (splinter) 08/18/2011     Priority: Medium     Problem list name updated by automated process. Provider to review       GERD (gastroesophageal reflux disease) 03/24/2011     Priority: Medium     Moderate major depression (H) 03/24/2011     Priority: Medium     Migraine headache 03/24/2011     Priority: Medium     (Problem list name updated by automated process. Provider to review and confirm.)       Tobacco abuse 11/05/2010     Priority: Medium     CARDIOVASCULAR SCREENING; LDL GOAL LESS THAN 160 10/31/2010     Priority: Medium        Past Medical History:    Past Medical History:   Diagnosis Date     Depressive disorder      Gastro-oesophageal reflux disease      Migraine      NO ACTIVE PROBLEMS        Past Surgical History:    Past Surgical History:   Procedure Laterality Date     LAPAROSCOPIC CHOLECYSTECTOMY N/A 3/2/2017    Procedure: LAPAROSCOPIC CHOLECYSTECTOMY;  Surgeon: Dwain Kent MD;  Location: [facility] OR     SURGICAL HISTORY OF -       left knee ACL replacement       Family History:    Family History   Problem Relation Age of Onset     Diabetes Father      Hypertension Father       Unknown/Adopted Maternal Grandfather      Unknown/Adopted Paternal Grandmother      Cancer Paternal Grandmother      Respiratory Paternal Grandfather         lung cancer     C.A.D. Paternal Grandfather      Cardiovascular Sister          at 3 days, malformed aorta     Genitourinary Problems Other         kidney failure, on dialysis, unclear reason     Cancer - colorectal Mother      Asthma No family hx of      Cerebrovascular Disease No family hx of      Breast Cancer No family hx of      Prostate Cancer No family hx of        Social History:  Marital Status:  Single [1]  Social History     Tobacco Use     Smoking status: Current Every Day Smoker     Packs/day: 1.00     Years: 10.00     Pack years: 10.00     Types: Cigarettes     Smokeless tobacco: Former User     Types: Chew   Substance Use Topics     Alcohol use: No     Comment: has not had any alchol in 5 months     Drug use: No        Medications:    HYDROcodone-acetaminophen (NORCO) 5-325 MG tablet  naproxen (NAPROSYN) 500 MG tablet          Review of Systems   Constitutional: Negative.    Musculoskeletal:        Right hand pain and swelling   Skin: Negative.    All other systems reviewed and are negative.      Physical Exam   BP: (!) 156/102  Pulse: 75  Temp: 98.6  F (37  C)  Resp: 20  Height: 182.9 cm (6')  Weight: 77.1 kg (170 lb)  SpO2: 98 %      Physical Exam  Constitutional:       General: He is not in acute distress.     Appearance: Normal appearance. He is well-developed. He is not ill-appearing, toxic-appearing or diaphoretic.   HENT:      Head: Normocephalic and atraumatic.   Eyes:      Extraocular Movements: Extraocular movements intact.      Conjunctiva/sclera: Conjunctivae normal.      Pupils: Pupils are equal, round, and reactive to light.   Neck:      Musculoskeletal: Normal range of motion and neck supple.   Cardiovascular:      Pulses: Normal pulses.   Pulmonary:      Effort: Pulmonary effort is normal.   Musculoskeletal:      Right  wrist: Normal. He exhibits normal range of motion, no tenderness, no bony tenderness, no swelling and no effusion.      Right hand: He exhibits decreased range of motion, tenderness, bony tenderness and swelling. He exhibits normal capillary refill, no deformity and no laceration. Normal sensation noted. Normal strength noted.      Comments: Tenderness and swelling overlying right fourth metacarpal.  No overlying skin changes or breaks in the skin.   Skin:     General: Skin is warm and dry.      Capillary Refill: Capillary refill takes less than 2 seconds.   Neurological:      General: No focal deficit present.      Mental Status: He is alert.      Sensory: Sensation is intact.      Motor: Motor function is intact.         [facility]    Splint Application    Date/Time: 6/4/2021 8:17 PM  Performed by: Zoraida Vickers PA-C  Authorized by: Zoraida Vickers PA-C       PRE-PROCEDURE DETAILS     Sensation:  Normal    Skin color:  Pink    PROCEDURE DETAILS     Laterality:  Right    Location:  Hand    Hand:  R hand    Splint type:  Ulnar gutter    Supplies:  Elastic bandage, cotton padding and Ortho-Glass    POST PROCEDURE DETAILS     Pain:  Unchanged    Sensation:  Normal    Skin color:  Pink    Patient tolerance of procedure:  Patient tolerated the procedure well with no immediate complications    PROCEDURE   Patient Tolerance:  Patient tolerated the procedure well with no immediate complications            Results for orders placed or performed during the hospital encounter of 06/04/21 (from the past 24 hour(s))   XR Hand Right G/E 3 Views    Narrative    EXAM: XR HAND RT G/E 3 VW  LOCATION: [facility]  DATE/TIME: 6/4/2021 6:54 PM    INDICATION: Pain and swelling of the fourth metacarpal.  COMPARISON: None.      Impression    IMPRESSION: Acute oblique fracture of the proximal metadiaphysis of the fourth metacarpal. There is dorsal displacement of the distal  metacarpal and mild dorsal apical angulation at the fracture margin. No extension to the CMC joint. Small ossicle at the   ulnar margin of the base of the fifth metacarpal consistent with sequela from prior injury. Deformity and angulation of the distal fifth metacarpal consistent with old healed fracture.       Medications - No data to display    Assessments & Plan (with Medical Decision Making)     Pt is a 45 year old male who presents with complaints of right hand pain and swelling since he punched a wall just prior to arrival.  Patient states he became angry with someone and instead of punching them, punched the wall.  Patient has had pain and swelling to the dorsum of his right hand since that time.     Pt is afebrile on arrival.  Exam as above.  X-rays of right hand show an acute oblique fracture of the proximal metadiaphysis of the fourth metacarpal.  Discussed results with patient.  Patient was splinted (see above procedure note).  Encouraged symptomatic treatments at home.  Return precautions were reviewed.  Hand-outs were provided.    Patient was sent with [place] and was instructed to follow-up with orthopedics in 3-5 days for continued care and management (referral was placed).  He is to return to the ED for persistent and/or worsening symptoms.  Patient expressed understanding of the diagnosis and plan and was discharged home in good condition.    I have reviewed the nursing notes.    I have reviewed the findings, diagnosis, plan and need for follow up with the patient.    Discharge Medication List as of 6/4/2021  7:37 PM      START taking these medications    Details   HYDROcodone-acetaminophen (NORCO) 5-325 MG tablet Take 1 tablet by mouth every 6 hours as needed for moderate to severe pain, Disp-10 tablet, R-0, E-Prescribe             Final diagnoses:   Closed nondisplaced fracture of base of fourth metacarpal bone of right hand, initial encounter       6/4/2021   [facility] EMERGENCY  DEPT      Disclaimer:  This note consists of symbols derived from keyboarding, dictation and/or voice recognition software.  As a result, there may be errors in the script that have gone undetected.  Please consider this when interpreting information found in this chart.     Zroaida Vickers PA-C  06/04/21 2019

## 2021-06-16 ENCOUNTER — ANCILLARY PROCEDURE (OUTPATIENT)
Dept: GENERAL RADIOLOGY | Facility: CLINIC | Age: 46
End: 2021-06-16
Attending: ORTHOPAEDIC SURGERY

## 2021-06-16 ENCOUNTER — OFFICE VISIT (OUTPATIENT)
Dept: ORTHOPEDICS | Facility: CLINIC | Age: 46
End: 2021-06-16
Attending: PHYSICIAN ASSISTANT

## 2021-06-16 VITALS
BODY MASS INDEX: 23.03 KG/M2 | HEIGHT: 72 IN | DIASTOLIC BLOOD PRESSURE: 87 MMHG | SYSTOLIC BLOOD PRESSURE: 131 MMHG | HEART RATE: 78 BPM | OXYGEN SATURATION: 98 % | WEIGHT: 170 LBS

## 2021-06-16 DIAGNOSIS — S62.314A CLOSED DISPLACED FRACTURE OF BASE OF FOURTH METACARPAL BONE OF RIGHT HAND, INITIAL ENCOUNTER: Primary | ICD-10-CM

## 2021-06-16 PROCEDURE — 73130 X-RAY EXAM OF HAND: CPT | Mod: RT | Performed by: RADIOLOGY

## 2021-06-16 PROCEDURE — 26600 TREAT METACARPAL FRACTURE: CPT | Mod: 55 | Performed by: ORTHOPAEDIC SURGERY

## 2021-06-16 PROCEDURE — 99204 OFFICE O/P NEW MOD 45 MIN: CPT | Mod: 57 | Performed by: ORTHOPAEDIC SURGERY

## 2021-06-16 RX ORDER — HYDROCODONE BITARTRATE AND ACETAMINOPHEN 5; 325 MG/1; MG/1
1 TABLET ORAL EVERY 6 HOURS PRN
COMMUNITY

## 2021-06-16 ASSESSMENT — MIFFLIN-ST. JEOR: SCORE: 1689.11

## 2021-06-16 ASSESSMENT — PAIN SCALES - GENERAL: PAINLEVEL: MILD PAIN (2)

## 2021-06-16 NOTE — PROGRESS NOTES
Chief Complaint:   Chief Complaint   Patient presents with     Right Hand - Pain     Hand Pain     DOI 06/04/21 Pt punched a wall and has closed non displaced fx of base of 4th metacarpal bone of right hand.        HPI: Sebastian Hatch is a 46 year old male , right -hand dominant, who presents for evaluation and management of a right hand injury. He injured his hand on 6/4/2021 while punching a wall. Onset of pain. Was seen in the ED and noted to have a 4th metacarpal base fracture. Splinted. Presents today for management.    It has been 12 days since the initial injury.    Reports prior right hand fracture a number of years ago.      He reports having mild pain/discomfort around the injury site. He denies associated numbness or tingling. He denies any other injuries to his upper extremity.   Symptoms: pain, swelling.  Location of symptoms: right hand ulnarly.  Pain severity: 2/10  Pain quality: dull and aching  Frequency of symptoms: are constant    Previous treatment: splint    Past medical history:  has a past medical history of Depressive disorder, Gastro-oesophageal reflux disease, Migraine, and NO ACTIVE PROBLEMS.   Patient Active Problem List    Diagnosis Date Noted     MENTAL HEALTH 11/22/2013     Priority: Medium     Alcohol abuse 05/31/2012     Priority: Medium     Shoulder and upper arm, superficial foreign body (splinter) 08/18/2011     Priority: Medium     Problem list name updated by automated process. Provider to review       GERD (gastroesophageal reflux disease) 03/24/2011     Priority: Medium     Moderate major depression (H) 03/24/2011     Priority: Medium     Migraine headache 03/24/2011     Priority: Medium     (Problem list name updated by automated process. Provider to review and confirm.)       Tobacco abuse 11/05/2010     Priority: Medium     CARDIOVASCULAR SCREENING; LDL GOAL LESS THAN 160 10/31/2010     Priority: Medium         Past surgical history:  has a past surgical history that  includes surgical history of -  and Laparoscopic cholecystectomy (N/A, 3/2/2017).     Medications:    Current Outpatient Medications   Medication Sig Dispense Refill     HYDROcodone-acetaminophen (NORCO) 5-325 MG tablet Take 1 tablet by mouth every 6 hours as needed for severe pain       naproxen (NAPROSYN) 500 MG tablet Take 500 mg by mouth 2 times daily          Allergies:     Allergies   Allergen Reactions     Nka [No Known Allergies]         Family History: family history includes C.A.D. in his paternal grandfather; Cancer in his paternal grandmother; Cancer - colorectal in his mother; Cardiovascular in his sister; Diabetes in his father; Genitourinary Problems in an other family member; Hypertension in his father; Respiratory in his paternal grandfather; Unknown/Adopted in his maternal grandfather and paternal grandmother.     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 He has a 10.00 pack-year smoking history. He has quit using smokeless tobacco.  His smokeless tobacco use included chew. He reports that he does not drink alcohol or use drugs.    Review of Systems:  ROS: 10 point ROS neg other than the symptoms noted above in the HPI and past medical history.    Physical Exam  GENERAL APPEARANCE: healthy, alert, no distress.   SKIN: no suspicious lesions or rashes  NEURO: Normal strength and tone, mentation intact and speech normal  PSYCH:  mentation appears normal and affect normal. Not anxious.  RESPIRATORY: No increased work of breathing.    /87   Pulse 78   Ht 1.829 m (6')   Wt 77.1 kg (170 lb)   SpO2 98%   BMI 23.06 kg/m       right HAND EXAM:    The splint was removed    Skin intact. No abnormal skin discoloration, erythema or ecchymosis.     There is mild swelling in the ulnar aspect, dorsal hand.  There is moderate tenderness in the ulnar aspect of the hand, base of 3-5th metacarpals.  There is no ecchymosis.  There is no erythema of the surrounding  skin.  There is no maceration of the skin.    There is no gross deformity in the area.  No scissoring of fingers with fist attempt. No rotational abnormality of finger with fist.  Able to make a fist.    Intact dip flexion against resistance with isolation of fdp, as well as intact fds with full pip and metacarpal phalangeal joint  flexion.   Alton test shows intact central slip.  No bowstringing.  Intact extensors. No extensor lag.    Intact sensation to light touch in median, radial, ulnar nerves of the hand  Intact sensation to the radial and ulnar digital nerves of the finger, as well as the finger tip.  Brisk capillary refill to all fingers.   Palpable radial pulse, 2+.    X-rays:  3 views right hand from 6/16/2021 were reviewed in clinic today. On my review, there is an oblique fracture of the proximal metadiaphysis of the fourth metacarpal. There is dorsal displacement of the distal metacarpal and mild dorsal apical angulation at the fracture margin. No extension to the CMC joint. Small ossicle at the   ulnar margin of the base of the fifth metacarpal consistent with sequela from prior injury. Deformity and angulation of the distal fifth metacarpal consistent with old healed fracture. Grossly stable alignment to previous images on 6/4/2021.    Assessment: 45yo RHD male with mildly displaced right hand 4th metacarpal base fracture .    Plan:  * nonop fracture management  * images reviewed, grossly stable fracture  * treatment is nonsurgical with cast immobilization, cast placed today  * rest, elevation, over the counter pain control  * light use of hand in cast is ok  * cast cares discussed  * reassess 4 weeks, xrays right hand OUT of cast, sooner if needed.      Cast/splint application    Date/Time: 6/16/2021 4:10 PM  Performed by: Kristal Choi ATC  Authorized by: Miguel Laureano MD     Consent:     Consent obtained:  Verbal    Consent given by:  Patient    Risks discussed:  Discoloration, numbness, pain  and swelling  Pre-procedure details:     Sensation:  Normal  Procedure details:     Laterality:  Right    Location:  Hand    Hand:  R hand    Splint type:  Ulnar gutter    Supplies:  Fiberglass  Post-procedure details:     Pain:  Unchanged    Patient tolerance of procedure:  Tolerated well, no immediate complications          Miguel Laureano M.D., M.S.  Dept. of Orthopaedic Surgery  [facility]

## 2021-06-16 NOTE — LETTER
6/16/2021         RE: Sebastian Hatch  [address]  [place] [address]        Dear Colleague,    Thank you for referring your patient, Sebastian Hatch, to the [facility]. Please see a copy of my visit note below.    Chief Complaint:   Chief Complaint   Patient presents with     Right Hand - Pain     Hand Pain     DOI 06/04/21 Pt punched a wall and has closed non displaced fx of base of 4th metacarpal bone of right hand.        HPI: Sebastian Hatch is a 46 year old male , right -hand dominant, who presents for evaluation and management of a right hand injury. He injured his hand on 6/4/2021 while punching a wall. Onset of pain. Was seen in the ED and noted to have a 4th metacarpal base fracture. Splinted. Presents today for management.    It has been 12 days since the initial injury.    Reports prior right hand fracture a number of years ago.      He reports having mild pain/discomfort around the injury site. He denies associated numbness or tingling. He denies any other injuries to his upper extremity.   Symptoms: pain, swelling.  Location of symptoms: right hand ulnarly.  Pain severity: 2/10  Pain quality: dull and aching  Frequency of symptoms: are constant    Previous treatment: splint    Past medical history:  has a past medical history of Depressive disorder, Gastro-oesophageal reflux disease, Migraine, and NO ACTIVE PROBLEMS.   Patient Active Problem List    Diagnosis Date Noted     MENTAL HEALTH 11/22/2013     Priority: Medium     Alcohol abuse 05/31/2012     Priority: Medium     Shoulder and upper arm, superficial foreign body (splinter) 08/18/2011     Priority: Medium     Problem list name updated by automated process. Provider to review       GERD (gastroesophageal reflux disease) 03/24/2011     Priority: Medium     Moderate major depression (H) 03/24/2011     Priority: Medium     Migraine headache 03/24/2011     Priority: Medium     (Problem list name updated  by automated process. Provider to review and confirm.)       Tobacco abuse 11/05/2010     Priority: Medium     CARDIOVASCULAR SCREENING; LDL GOAL LESS THAN 160 10/31/2010     Priority: Medium         Past surgical history:  has a past surgical history that includes surgical history of -  and Laparoscopic cholecystectomy (N/A, 3/2/2017).     Medications:    Current Outpatient Medications   Medication Sig Dispense Refill     HYDROcodone-acetaminophen (NORCO) 5-325 MG tablet Take 1 tablet by mouth every 6 hours as needed for severe pain       naproxen (NAPROSYN) 500 MG tablet Take 500 mg by mouth 2 times daily          Allergies:     Allergies   Allergen Reactions     Nka [No Known Allergies]         Family History: family history includes C.A.D. in his paternal grandfather; Cancer in his paternal grandmother; Cancer - colorectal in his mother; Cardiovascular in his sister; Diabetes in his father; Genitourinary Problems in an other family member; Hypertension in his father; Respiratory in his paternal grandfather; Unknown/Adopted in his maternal grandfather and paternal grandmother.     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 He has a 10.00 pack-year smoking history. He has quit using smokeless tobacco.  His smokeless tobacco use included chew. He reports that he does not drink alcohol or use drugs.    Review of Systems:  ROS: 10 point ROS neg other than the symptoms noted above in the HPI and past medical history.    Physical Exam  GENERAL APPEARANCE: healthy, alert, no distress.   SKIN: no suspicious lesions or rashes  NEURO: Normal strength and tone, mentation intact and speech normal  PSYCH:  mentation appears normal and affect normal. Not anxious.  RESPIRATORY: No increased work of breathing.    /87   Pulse 78   Ht 1.829 m (6')   Wt 77.1 kg (170 lb)   SpO2 98%   BMI 23.06 kg/m       right HAND EXAM:    The splint was removed    Skin intact. No abnormal skin  discoloration, erythema or ecchymosis.     There is mild swelling in the ulnar aspect, dorsal hand.  There is moderate tenderness in the ulnar aspect of the hand, base of 3-5th metacarpals.  There is no ecchymosis.  There is no erythema of the surrounding skin.  There is no maceration of the skin.    There is no gross deformity in the area.  No scissoring of fingers with fist attempt. No rotational abnormality of finger with fist.  Able to make a fist.    Intact dip flexion against resistance with isolation of fdp, as well as intact fds with full pip and metacarpal phalangeal joint  flexion.   Alton test shows intact central slip.  No bowstringing.  Intact extensors. No extensor lag.    Intact sensation to light touch in median, radial, ulnar nerves of the hand  Intact sensation to the radial and ulnar digital nerves of the finger, as well as the finger tip.  Brisk capillary refill to all fingers.   Palpable radial pulse, 2+.    X-rays:  3 views right hand from 6/16/2021 were reviewed in clinic today. On my review, there is an oblique fracture of the proximal metadiaphysis of the fourth metacarpal. There is dorsal displacement of the distal metacarpal and mild dorsal apical angulation at the fracture margin. No extension to the CMC joint. Small ossicle at the   ulnar margin of the base of the fifth metacarpal consistent with sequela from prior injury. Deformity and angulation of the distal fifth metacarpal consistent with old healed fracture. Grossly stable alignment to previous images on 6/4/2021.    Assessment: 47yo RHD male with mildly displaced right hand 4th metacarpal base fracture .    Plan:  * nonop fracture management  * images reviewed, grossly stable fracture  * treatment is nonsurgical with cast immobilization, cast placed today  * rest, elevation, over the counter pain control  * light use of hand in cast is ok  * cast cares discussed  * reassess 4 weeks, xrays right hand OUT of cast, sooner if  needed.      Cast/splint application    Date/Time: 6/16/2021 4:10 PM  Performed by: Kristal Choi ATC  Authorized by: Miguel Laureano MD     Consent:     Consent obtained:  Verbal    Consent given by:  Patient    Risks discussed:  Discoloration, numbness, pain and swelling  Pre-procedure details:     Sensation:  Normal  Procedure details:     Laterality:  Right    Location:  Hand    Hand:  R hand    Splint type:  Ulnar gutter    Supplies:  Fiberglass  Post-procedure details:     Pain:  Unchanged    Patient tolerance of procedure:  Tolerated well, no immediate complications          Miguel Laureano M.D., M.S.  Dept. of Orthopaedic Surgery  [facility]          Again, thank you for allowing me to participate in the care of your patient.        Sincerely,        Miguel Laureano MD

## 2022-05-05 ENCOUNTER — NURSE TRIAGE (OUTPATIENT)
Dept: NURSING | Facility: CLINIC | Age: 47
End: 2022-05-05

## 2022-05-05 NOTE — TELEPHONE ENCOUNTER
Patient was at work.  Patient was leaning down and after standing up had a knot in his calf.  The knot poked out from the back of his leg and it felt like a blood vessel popped.  This occurred about 5 hours prior to the call.  The patient returned home from work.  He applied ice and elevated the leg.  He states the bruise is 3-4 in across. It is sore and hurts.  There is no more knot.  It is not red or tender to touch.  It hurts down deep.  He can walk but it is uncomfortable.    Patient denies fever, is not dizzy or lightheaded, there was no injury.  The bruise is not raised and not getting any bigger.  Patient is not on any blood thinners.    Patient will continue to monitor.  If severe pain, breathing difficulties, bruise gets bigger, redness or warm cecelia ch develops patient should call back.    Patient is requesting a message be sent to his PCP, he works tomorrow and cannot come into the office.  He would like to know if he really needs to go in.  Patient is at [facility] and needs to call the [facility] office to request follow up from the provider.  Gave him the number [phone] , [facility] 24 hour nurse line on his VM.    Rachel Timmons RN   05/05/22 6:22 PM  [facility] Nurse Advisor    Additional Information    Negative: Shock suspected (e.g., cold/pale/clammy skin, too weak to stand, low BP, rapid pulse)    Negative: Sounds like a life-threatening emergency to the triager    Negative: Bruises with fever    Negative: Tiny bruises (spots or dots) of unknown cause    Negative: Bruise(s) of forehead or head    Negative: Bruise(s) of face or jaw    Negative: Followed an injury, and triager doesn't know which injury guideline to use first    Negative: Post-operative bruising    Negative: Dizziness or lightheadedness    Negative: [1] Bruise on head/face, chest, or abdomen AND [2]  taking Coumadin (warfarin) or other strong blood thinner, or known bleeding disorder (e.g., thrombocytopenia)    Negative:  Unexplained bleeding from another site (e.g., gums, nose, urine) as well    Negative: Patient sounds very sick or weak to the triager    Negative: [1] Not caused by an injury AND [2] 5 or more bruises now    Negative: [1] Raised bruise AND [2] size > 2 inches (5 cm) AND [3] getting bigger    Negative: [1] SEVERE pain AND [2] not improved 2 hours after pain medicine/ice packs    Negative: Suspicious history for the injury    Negative: Taking Coumadin (warfarin) or other strong blood thinner, or known bleeding disorder (e.g., thrombocytopenia)    [1] Not caused by an injury AND [2] < 5 unexplained bruises    Protocols used: BRUISES-A-AH

## 2023-09-01 ENCOUNTER — HOSPITAL ENCOUNTER (EMERGENCY)
Facility: CLINIC | Age: 48
Discharge: HOME | End: 2023-09-01
Attending: FAMILY MEDICINE | Admitting: FAMILY MEDICINE
Payer: OTHER MISCELLANEOUS

## 2023-09-01 VITALS
BODY MASS INDEX: 23.03 KG/M2 | RESPIRATION RATE: 18 BRPM | OXYGEN SATURATION: 99 % | TEMPERATURE: 97.6 F | DIASTOLIC BLOOD PRESSURE: 91 MMHG | HEIGHT: 72 IN | HEART RATE: 79 BPM | WEIGHT: 170 LBS | SYSTOLIC BLOOD PRESSURE: 137 MMHG

## 2023-09-01 DIAGNOSIS — S29.012A MUSCLE STRAIN OF RIGHT UPPER BACK, INITIAL ENCOUNTER: ICD-10-CM

## 2023-09-01 PROCEDURE — 250N000011 HC RX IP 250 OP 636: Performed by: FAMILY MEDICINE

## 2023-09-01 PROCEDURE — 90471 IMMUNIZATION ADMIN: CPT | Performed by: FAMILY MEDICINE

## 2023-09-01 PROCEDURE — 99283 EMERGENCY DEPT VISIT LOW MDM: CPT | Mod: 25

## 2023-09-01 PROCEDURE — 90715 TDAP VACCINE 7 YRS/> IM: CPT | Performed by: FAMILY MEDICINE

## 2023-09-01 PROCEDURE — 99284 EMERGENCY DEPT VISIT MOD MDM: CPT | Performed by: FAMILY MEDICINE

## 2023-09-01 RX ORDER — CYCLOBENZAPRINE HCL 10 MG
10 TABLET ORAL 3 TIMES DAILY PRN
Qty: 12 TABLET | Refills: 0 | Status: SHIPPED | OUTPATIENT
Start: 2023-09-01 | End: 2023-09-07

## 2023-09-01 RX ADMIN — CLOSTRIDIUM TETANI TOXOID ANTIGEN (FORMALDEHYDE INACTIVATED), CORYNEBACTERIUM DIPHTHERIAE TOXOID ANTIGEN (FORMALDEHYDE INACTIVATED), BORDETELLA PERTUSSIS TOXOID ANTIGEN (GLUTARALDEHYDE INACTIVATED), BORDETELLA PERTUSSIS FILAMENTOUS HEMAGGLUTININ ANTIGEN (FORMALDEHYDE INACTIVATED), BORDETELLA PERTUSSIS PERTACTIN ANTIGEN, AND BORDETELLA PERTUSSIS FIMBRIAE 2/3 ANTIGEN 0.5 ML: 5; 2; 2.5; 5; 3; 5 INJECTION, SUSPENSION INTRAMUSCULAR at 12:44

## 2023-09-01 NOTE — DISCHARGE INSTRUCTIONS
RETURN TO THE EMERGENCY ROOM FOR THE FOLLOWING:    Severely worsened breathing, fever greater than 101, or at anytime for any concern.    FOLLOW UP:    With your primary clinic next week for further restrictions, if needed.    TREATMENT RECOMMENDATIONS:    Ibuprofen 600 mg every six hours for pain (7 days duration).  Tylenol 1000 mg every six hours for pain (7 days duration).  Therefore, you can alternate these every three hours and do it safely.    Flexeril for pain not relieved by the above.    NURSE ADVICE LINE:  [phone] or [phone]

## 2023-09-01 NOTE — ED TRIAGE NOTES
Pt was lifting a heavy window at work this morning and felt a pop. Here with right upper back pain rated 10/10. Starts at the shoulder blade and goes down.     Pt is also requesting a tetanus booster.      Triage Assessment       Row Name 09/01/23 1037       Triage Assessment (Adult)    Airway WDL WDL       Respiratory WDL    Respiratory WDL WDL       Skin Circulation/Temperature WDL    Skin Circulation/Temperature WDL WDL       Cardiac WDL    Cardiac WDL WDL       Peripheral/Neurovascular WDL    Peripheral Neurovascular WDL WDL       Cognitive/Neuro/Behavioral WDL    Cognitive/Neuro/Behavioral WDL WDL

## 2023-09-01 NOTE — ED PROVIDER NOTES
HPI   Patient is a 48-year-old male presenting with an injury to his right upper/mid back.  This occurred this morning while at work.  The patient was helping his boss lift a large window and he felt a sudden pull or give in the right upper back.  He has had significantly worsened pain since the initial injury.  He denies shortness of breath or hemoptysis.  No recent cough or congestion, no fever.  He denies other injury.  No paresthesia.  No new weakness.  He has not had similar symptoms previously.      Allergies:  Allergies   Allergen Reactions    Nka [No Known Allergies]      Problem List:    Patient Active Problem List    Diagnosis Date Noted    MENTAL HEALTH 11/22/2013     Priority: Medium    Alcohol abuse 05/31/2012     Priority: Medium    Shoulder and upper arm, superficial foreign body (splinter) 08/18/2011     Priority: Medium     Problem list name updated by automated process. Provider to review      GERD (gastroesophageal reflux disease) 03/24/2011     Priority: Medium    Moderate major depression (H) 03/24/2011     Priority: Medium    Migraine headache 03/24/2011     Priority: Medium     (Problem list name updated by automated process. Provider to review and confirm.)      Tobacco abuse 11/05/2010     Priority: Medium    CARDIOVASCULAR SCREENING; LDL GOAL LESS THAN 160 10/31/2010     Priority: Medium      Past Medical History:    Past Medical History:   Diagnosis Date    Depressive disorder     Gastro-oesophageal reflux disease     Migraine     NO ACTIVE PROBLEMS      Past Surgical History:    Past Surgical History:   Procedure Laterality Date    LAPAROSCOPIC CHOLECYSTECTOMY N/A 3/2/2017    Procedure: LAPAROSCOPIC CHOLECYSTECTOMY;  Surgeon: Dwain Kent MD;  Location: [facility] OR    SURGICAL HISTORY OF -       left knee ACL replacement     Family History:    Family History   Problem Relation Age of Onset    Diabetes Father     Hypertension Father     Unknown/Adopted Maternal Grandfather      Unknown/Adopted Paternal Grandmother     Cancer Paternal Grandmother     Respiratory Paternal Grandfather         lung cancer    C.A.D. Paternal Grandfather     Cardiovascular Sister          at 3 days, malformed aorta    Genitourinary Problems Other         kidney failure, on dialysis, unclear reason    Cancer - colorectal Mother     Asthma No family hx of     Cerebrovascular Disease No family hx of     Breast Cancer No family hx of     Prostate Cancer No family hx of      Social History:  Marital Status:  Single [1]  Social History     Tobacco Use    Smoking status: Every Day     Packs/day: 1.00     Years: 10.00     Pack years: 10.00     Types: Cigarettes    Smokeless tobacco: Former     Types: Chew   Substance Use Topics    Alcohol use: No     Comment: has not had any alchol in 5 months    Drug use: No      Medications:    cyclobenzaprine (FLEXERIL) 10 MG tablet  HYDROcodone-acetaminophen (NORCO) 5-325 MG tablet  naproxen (NAPROSYN) 500 MG tablet      Review of Systems   All other systems reviewed and are negative.      PE   BP: (!) 137/91  Pulse: 79  Temp: 97.6  F (36.4  C)  Resp: 18  Height: 182.9 cm (6')  Weight: 77.1 kg (170 lb)  SpO2: 99 %  Physical Exam  Vitals and nursing note reviewed.   Constitutional:       General: He is not in acute distress.  HENT:      Head: Atraumatic.      Right Ear: External ear normal.      Left Ear: External ear normal.      Nose: Nose normal.      Mouth/Throat:      Mouth: Mucous membranes are moist.      Pharynx: Oropharynx is clear.   Eyes:      General: No scleral icterus.     Extraocular Movements: Extraocular movements intact.      Conjunctiva/sclera: Conjunctivae normal.      Pupils: Pupils are equal, round, and reactive to light.   Cardiovascular:      Rate and Rhythm: Normal rate.   Pulmonary:      Effort: Pulmonary effort is normal. No respiratory distress.   Musculoskeletal:         General: Normal range of motion.      Cervical back: Normal range of motion.       Comments: Patient sitting relatively comfortable in bed.  He is able to get up and move on his own but with obvious pain.  No midline thoracic or lumbar tenderness.  He does have some mild discomfort as I am pushing laterally just below the right scapula.  No gross deformity.  No hematoma.  No ecchymosis or overlying skin change.   Skin:     General: Skin is warm and dry.   Neurological:      Mental Status: He is alert and oriented to person, place, and time.   Psychiatric:         Behavior: Behavior normal.         ED COURSE and MDM   1213.  Patient has symptoms and signs as described above.  Patient with right upper back muscle strain.  No indication for imaging at this time.  Work restrictions provided, letter printed.  Follow-up if not improving as expected.  Ibuprofen and Tylenol for comfort, use Flexeril for sleep as needed.    Electronic medical chart reviewed, including medical problems, medications, medical allergies, social history.  Recent hospitalizations and surgical procedures reviewed.  Recent clinic visits and consultations reviewed.  Recent labs and test results reviewed.  Nursing notes reviewed.    The patient, their parent if applicable, and/or their medical decision maker(s) and I have reviewed all of the available historical information, applicable PMH, physical exam findings, and objective diagnostic data gathered during this ED visit.  We then discussed all work-up options and then together agreed upon the course taken during this visit.  The ultimate disposition and plan was a cooperative decision made between myself and the patient, their parent if applicable, and/or their legal decision maker(s).  The risks and benefits of all decisions made during this visit were discussed to the best of my abilities given the circumstances, and all parties are understanding of the pertinent ramifications of these decisions.      LABS  Labs Ordered and Resulted from Time of ED Arrival to Time of ED  Departure - No data to display    IMAGING  Images reviewed by me.  Radiology report also reviewed.  No orders to display       Procedures    Medications   Tdap (tetanus-diphtheria-acell pertussis) (ADACEL) injection 0.5 mL (has no administration in time range)         IMPRESSION       ICD-10-CM    1. Muscle strain of right upper back, initial encounter  S29.012A                Medication List        Started      cyclobenzaprine 10 MG tablet  Commonly known as: FLEXERIL  10 mg, Oral, 3 TIMES DAILY PRN                          Brenden Maria MD  09/01/23 8842

## 2023-09-01 NOTE — LETTER
[facility] EMERGENCY DEPT  [address] [address]  [phone]      2023    Sebastian Hatch  [address]  [place] [address]  [phone] (home)     : 1975      To Whom it may concern:    Sebastian Hatch was seen in our Emergency Department today, 2023 for an injury that was reported to be work related.  He should avoid lifting/pulling/carrying/pushing anything greater than 5 pounds over the next 3-5 days.  He can return to work full duty if he feels that he is able to do so without significant pain.    Sincerely,    Brenden Maria MD

## 2024-04-24 ENCOUNTER — HOSPITAL ENCOUNTER (EMERGENCY)
Facility: CLINIC | Age: 49
Discharge: HOME | End: 2024-04-25
Attending: EMERGENCY MEDICINE | Admitting: EMERGENCY MEDICINE

## 2024-04-24 ENCOUNTER — NURSE TRIAGE (OUTPATIENT)
Dept: NURSING | Facility: CLINIC | Age: 49
End: 2024-04-24
Payer: OTHER MISCELLANEOUS

## 2024-04-24 VITALS
SYSTOLIC BLOOD PRESSURE: 157 MMHG | HEART RATE: 77 BPM | RESPIRATION RATE: 20 BRPM | TEMPERATURE: 97.5 F | OXYGEN SATURATION: 100 % | WEIGHT: 170 LBS | HEIGHT: 72 IN | BODY MASS INDEX: 23.03 KG/M2 | DIASTOLIC BLOOD PRESSURE: 109 MMHG

## 2024-04-24 DIAGNOSIS — R20.0 NUMBNESS: ICD-10-CM

## 2024-04-24 DIAGNOSIS — F41.9 ANXIETY: ICD-10-CM

## 2024-04-24 LAB
ALBUMIN SERPL BCG-MCNC: 4.6 G/DL (ref 3.5–5.2)
ALP SERPL-CCNC: 77 U/L (ref 40–150)
ALT SERPL W P-5'-P-CCNC: 52 U/L (ref 0–70)
ANION GAP SERPL CALCULATED.3IONS-SCNC: 13 MMOL/L (ref 7–15)
AST SERPL W P-5'-P-CCNC: 51 U/L (ref 0–45)
BASOPHILS # BLD AUTO: 0.1 10E3/UL (ref 0–0.2)
BASOPHILS NFR BLD AUTO: 1 %
BILIRUB SERPL-MCNC: 0.2 MG/DL
BUN SERPL-MCNC: 7.8 MG/DL (ref 6–20)
CALCIUM SERPL-MCNC: 9 MG/DL (ref 8.6–10)
CHLORIDE SERPL-SCNC: 107 MMOL/L (ref 98–107)
CREAT SERPL-MCNC: 0.82 MG/DL (ref 0.67–1.17)
DEPRECATED HCO3 PLAS-SCNC: 24 MMOL/L (ref 22–29)
EGFRCR SERPLBLD CKD-EPI 2021: >90 ML/MIN/1.73M2
EOSINOPHIL # BLD AUTO: 0.1 10E3/UL (ref 0–0.7)
EOSINOPHIL NFR BLD AUTO: 2 %
ERYTHROCYTE [DISTWIDTH] IN BLOOD BY AUTOMATED COUNT: 12.7 % (ref 10–15)
GLUCOSE SERPL-MCNC: 94 MG/DL (ref 70–99)
HCT VFR BLD AUTO: 41.3 % (ref 40–53)
HGB BLD-MCNC: 14.4 G/DL (ref 13.3–17.7)
IMM GRANULOCYTES # BLD: 0 10E3/UL
IMM GRANULOCYTES NFR BLD: 0 %
LYMPHOCYTES # BLD AUTO: 2.5 10E3/UL (ref 0.8–5.3)
LYMPHOCYTES NFR BLD AUTO: 36 %
MCH RBC QN AUTO: 33.8 PG (ref 26.5–33)
MCHC RBC AUTO-ENTMCNC: 34.9 G/DL (ref 31.5–36.5)
MCV RBC AUTO: 97 FL (ref 78–100)
MONOCYTES # BLD AUTO: 1 10E3/UL (ref 0–1.3)
MONOCYTES NFR BLD AUTO: 14 %
NEUTROPHILS # BLD AUTO: 3.3 10E3/UL (ref 1.6–8.3)
NEUTROPHILS NFR BLD AUTO: 47 %
NRBC # BLD AUTO: 0 10E3/UL
NRBC BLD AUTO-RTO: 0 /100
PLATELET # BLD AUTO: 345 10E3/UL (ref 150–450)
POTASSIUM SERPL-SCNC: 3.8 MMOL/L (ref 3.4–5.3)
PROT SERPL-MCNC: 7 G/DL (ref 6.4–8.3)
RBC # BLD AUTO: 4.26 10E6/UL (ref 4.4–5.9)
SODIUM SERPL-SCNC: 144 MMOL/L (ref 135–145)
TROPONIN T SERPL HS-MCNC: <6 NG/L
WBC # BLD AUTO: 6.9 10E3/UL (ref 4–11)

## 2024-04-24 PROCEDURE — 84484 ASSAY OF TROPONIN QUANT: CPT | Performed by: EMERGENCY MEDICINE

## 2024-04-24 PROCEDURE — 85025 COMPLETE CBC W/AUTO DIFF WBC: CPT | Performed by: EMERGENCY MEDICINE

## 2024-04-24 PROCEDURE — 80053 COMPREHEN METABOLIC PANEL: CPT | Performed by: EMERGENCY MEDICINE

## 2024-04-24 PROCEDURE — 99284 EMERGENCY DEPT VISIT MOD MDM: CPT | Performed by: EMERGENCY MEDICINE

## 2024-04-24 PROCEDURE — 36415 COLL VENOUS BLD VENIPUNCTURE: CPT | Performed by: EMERGENCY MEDICINE

## 2024-04-24 PROCEDURE — 99283 EMERGENCY DEPT VISIT LOW MDM: CPT | Performed by: EMERGENCY MEDICINE

## 2024-04-24 PROCEDURE — 250N000013 HC RX MED GY IP 250 OP 250 PS 637: Performed by: EMERGENCY MEDICINE

## 2024-04-24 RX ORDER — HYDROXYZINE HYDROCHLORIDE 25 MG/1
50 TABLET, FILM COATED ORAL ONCE
Status: COMPLETED | OUTPATIENT
Start: 2024-04-24 | End: 2024-04-24

## 2024-04-24 RX ORDER — LORAZEPAM 1 MG/1
1 TABLET ORAL ONCE
Status: COMPLETED | OUTPATIENT
Start: 2024-04-24 | End: 2024-04-24

## 2024-04-24 RX ADMIN — LORAZEPAM 1 MG: 1 TABLET ORAL at 22:58

## 2024-04-24 RX ADMIN — HYDROXYZINE HYDROCHLORIDE 50 MG: 25 TABLET, FILM COATED ORAL at 22:57

## 2024-04-24 ASSESSMENT — ACTIVITIES OF DAILY LIVING (ADL): ADLS_ACUITY_SCORE: 35

## 2024-04-24 ASSESSMENT — COLUMBIA-SUICIDE SEVERITY RATING SCALE - C-SSRS
1. IN THE PAST MONTH, HAVE YOU WISHED YOU WERE DEAD OR WISHED YOU COULD GO TO SLEEP AND NOT WAKE UP?: NO
6. HAVE YOU EVER DONE ANYTHING, STARTED TO DO ANYTHING, OR PREPARED TO DO ANYTHING TO END YOUR LIFE?: NO
2. HAVE YOU ACTUALLY HAD ANY THOUGHTS OF KILLING YOURSELF IN THE PAST MONTH?: NO

## 2024-04-25 RX ORDER — HYDROXYZINE HYDROCHLORIDE 25 MG/1
25-50 TABLET, FILM COATED ORAL 3 TIMES DAILY PRN
Qty: 50 TABLET | Refills: 0 | Status: SHIPPED | OUTPATIENT
Start: 2024-04-24

## 2024-04-25 NOTE — TELEPHONE ENCOUNTER
Nurse Triage SBAR    Is this a 2nd Level Triage? NO    Situation: Pt calling with concerns for high blood pressure.    Background: Pt states he was feeling off tonight so checked his BP. He has been on BP medications in the past about 10 years ago.    Assessment: Pt states his BP was 180/130 and 160/90 after checking a second time. He is having tingling in his forearms and legs bilaterally. Denies SOB and CP.    Protocol Recommended Disposition:   Go to ED Now    Recommendation: Recommendation to go to the ER. He states he has someone who can bring him in.    Reason for Disposition   [1] Systolic BP  >= 160 OR Diastolic >= 100 AND [2] cardiac or neurologic symptoms (e.g., chest pain, difficulty breathing, unsteady gait, blurred vision)    Additional Information   Negative: Difficult to awaken or acting confused (e.g., disoriented, slurred speech)   Negative: Sounds like a life-threatening emergency to the triager   Negative: [1] Chest pain AND [2] took nitrogylcerin AND [3] pain was not relieved   Negative: [1] Chest pain lasts > 5 minutes AND [2] history of heart disease  (i.e., heart attack, bypass surgery, angina, angioplasty, CHF)   Negative: [1] Weakness of the face, arm or leg on one side of the body AND [2] new onset   Negative: [1] Numbness (i.e., loss of sensation) of the face, arm or leg on one side of the body AND [2] new onset   Negative: Severe difficulty breathing (e.g., struggling for each breath, speaks in single words)    Protocols used: High Blood Pressure-A-    Beatrice Menchaca RN  [facility] Nurse Advisor   4/24/2024  9:46 PM

## 2024-04-25 NOTE — DISCHARGE INSTRUCTIONS
Follow-up in clinic for routine cares.    Hydroxyzine can be taken as needed for stress, anxiety, or tingling.  Return if worsening symptoms.

## 2024-04-25 NOTE — ED TRIAGE NOTES
"Pt presents with concerns for bilateral arm and leg numbness and tingling that began 45 minutes ago. Pt states it feels like hands and feet are asleep. Pt also endorses feelings \"off\". Pt states he has had this before, but not as severe and was told it was related to anxiety.      Triage Assessment (Adult)       Row Name 04/24/24 1112          Triage Assessment    Airway WDL WDL        Respiratory WDL    Respiratory WDL WDL        Skin Circulation/Temperature WDL    Skin Circulation/Temperature WDL WDL        Cardiac WDL    Cardiac WDL WDL        Peripheral/Neurovascular WDL    Peripheral Neurovascular WDL WDL        Cognitive/Neuro/Behavioral WDL    Cognitive/Neuro/Behavioral WDL WDL                     "

## 2024-04-25 NOTE — ED PROVIDER NOTES
ED Provider Note  [facility]      History     Chief Complaint   Patient presents with    Numbness     BUE and BLE     HPI  Sebastian Hatch is a 48 year old male who presents to the emergency department with concerns regarding bilateral upper extremity and additional bilateral lower extremity numbness, and tingling.  Symptoms began about 1.5 hours prior to ED arrival.  Patient was winding down this night, getting ready for bed, and subsequently began experiencing the numbness, and tingling.  He feels as if it is pins-and-needles type sensation.  Denies any severe chest pains.  He did call the nurse triage line, and I reviewed that telephone visit.  Patient did take his blood pressure, which was noted to be elevated.  No other recent changes in health, or medications.  Takes no daily medications.  Does have recent increased amounts of stresses in life.        Independent Historian:        Review of External Notes:  I reviewed office visit from November, 2023.  Patient seen in clinic for sore throat.      Allergies:  Allergies   Allergen Reactions    Nka [No Known Allergies]        Problem List:    Patient Active Problem List    Diagnosis Date Noted    MENTAL HEALTH 11/22/2013     Priority: Medium    Alcohol abuse 05/31/2012     Priority: Medium    Shoulder and upper arm, superficial foreign body (splinter) 08/18/2011     Priority: Medium     Problem list name updated by automated process. Provider to review      GERD (gastroesophageal reflux disease) 03/24/2011     Priority: Medium    Moderate major depression (H) 03/24/2011     Priority: Medium    Migraine headache 03/24/2011     Priority: Medium     (Problem list name updated by automated process. Provider to review and confirm.)      Tobacco abuse 11/05/2010     Priority: Medium    CARDIOVASCULAR SCREENING; LDL GOAL LESS THAN 160 10/31/2010     Priority: Medium        Past Medical History:    Past Medical History:   Diagnosis Date     Depressive disorder     Gastro-oesophageal reflux disease     Migraine     NO ACTIVE PROBLEMS        Past Surgical History:    Past Surgical History:   Procedure Laterality Date    LAPAROSCOPIC CHOLECYSTECTOMY N/A 3/2/2017    Procedure: LAPAROSCOPIC CHOLECYSTECTOMY;  Surgeon: Dwain Kent MD;  Location: [facility] OR    SURGICAL HISTORY OF -       left knee ACL replacement       Family History:    Family History   Problem Relation Age of Onset    Diabetes Father     Hypertension Father     Unknown/Adopted Maternal Grandfather     Unknown/Adopted Paternal Grandmother     Cancer Paternal Grandmother     Respiratory Paternal Grandfather         lung cancer    C.A.D. Paternal Grandfather     Cardiovascular Sister          at 3 days, malformed aorta    Genitourinary Problems Other         kidney failure, on dialysis, unclear reason    Cancer - colorectal Mother     Asthma No family hx of     Cerebrovascular Disease No family hx of     Breast Cancer No family hx of     Prostate Cancer No family hx of        Social History:  Marital Status:  Single [1]  Social History     Tobacco Use    Smoking status: Every Day     Current packs/day: 1.00     Average packs/day: 1 pack/day for 10.0 years (10.0 ttl pk-yrs)     Types: Cigarettes    Smokeless tobacco: Former     Types: Chew   Substance Use Topics    Alcohol use: No     Comment: has not had any alchol in 5 months    Drug use: No        Medications:    hydrOXYzine HCl (ATARAX) 25 MG tablet  HYDROcodone-acetaminophen (NORCO) 5-325 MG tablet  naproxen (NAPROSYN) 500 MG tablet          Review of Systems  A medically appropriate review of systems was performed with pertinent positives and negatives noted in the HPI, and all other systems negative.    Physical Exam   Patient Vitals for the past 24 hrs:   BP Temp Temp src Pulse Resp SpO2 Height Weight   24 2218 (!) 157/109 97.5  F (36.4  C) Tympanic 77 20 100 % 1.829 m (6') 77.1 kg (170 lb)          Physical  Exam  General: alert and in acute distress on arrival  Head: atraumatic, normocephalic  Lungs:  nonlabored  CV:  extremities warm and perfused  Abd: nondistended  Skin: no rashes, no diaphoresis and skin color normal  Neuro: Patient awake, alert, speech is fluent,   Psychiatric: affect/mood normal,        ED Course                 Procedures                           Results for orders placed or performed during the hospital encounter of 04/24/24 (from the past 24 hour(s))   CBC with platelets, differential    Narrative    The following orders were created for panel order CBC with platelets, differential.  Procedure                               Abnormality         Status                     ---------                               -----------         ------                     CBC with platelets and d...[[id number]]  Abnormal            Final result                 Please view results for these tests on the individual orders.   Comprehensive metabolic panel   Result Value Ref Range    Sodium 144 135 - 145 mmol/L    Potassium 3.8 3.4 - 5.3 mmol/L    Carbon Dioxide (CO2) 24 22 - 29 mmol/L    Anion Gap 13 7 - 15 mmol/L    Urea Nitrogen 7.8 6.0 - 20.0 mg/dL    Creatinine 0.82 0.67 - 1.17 mg/dL    GFR Estimate >90 >60 mL/min/1.73m2    Calcium 9.0 8.6 - 10.0 mg/dL    Chloride 107 98 - 107 mmol/L    Glucose 94 70 - 99 mg/dL    Alkaline Phosphatase 77 40 - 150 U/L    AST 51 (H) 0 - 45 U/L    ALT 52 0 - 70 U/L    Protein Total 7.0 6.4 - 8.3 g/dL    Albumin 4.6 3.5 - 5.2 g/dL    Bilirubin Total 0.2 <=1.2 mg/dL   Troponin T, High Sensitivity   Result Value Ref Range    Troponin T, High Sensitivity <6 <=22 ng/L   CBC with platelets and differential   Result Value Ref Range    WBC Count 6.9 4.0 - 11.0 10e3/uL    RBC Count 4.26 (L) 4.40 - 5.90 10e6/uL    Hemoglobin 14.4 13.3 - 17.7 g/dL    Hematocrit 41.3 40.0 - 53.0 %    MCV 97 78 - 100 fL    MCH 33.8 (H) 26.5 - 33.0 pg    MCHC 34.9 31.5 - 36.5 g/dL    RDW 12.7 10.0 - 15.0 %     Platelet Count 345 150 - 450 10e3/uL    % Neutrophils 47 %    % Lymphocytes 36 %    % Monocytes 14 %    % Eosinophils 2 %    % Basophils 1 %    % Immature Granulocytes 0 %    NRBCs per 100 WBC 0 <1 /100    Absolute Neutrophils 3.3 1.6 - 8.3 10e3/uL    Absolute Lymphocytes 2.5 0.8 - 5.3 10e3/uL    Absolute Monocytes 1.0 0.0 - 1.3 10e3/uL    Absolute Eosinophils 0.1 0.0 - 0.7 10e3/uL    Absolute Basophils 0.1 0.0 - 0.2 10e3/uL    Absolute Immature Granulocytes 0.0 <=0.4 10e3/uL    Absolute NRBCs 0.0 10e3/uL       MEDICATIONS GIVEN IN THE EMERGENCY DEPARTMENT:  Medications   hydrOXYzine HCl (ATARAX) tablet 50 mg (50 mg Oral $Given 4/24/24 0080)   LORazepam (ATIVAN) tablet 1 mg (1 mg Oral $Given 4/24/24 2279)           Independent Interpretation (X-rays, CTs, rhythm strip):  None    Consultations/Discussion of Management or Tests:         Social Determinants of Health affecting care:         Assessments & Plan (with Medical Decision Making)  48 year old male who presents to the Emergency Department for evaluation of bilateral arm, and leg numbness and tingling.  This is bilateral in nature, extending from the elbows down to the hands.  This is also in the feet, and legs.  Symptoms present over the past 1.5 hours.  Patient afebrile, normal vitals.    Based on symptoms, will rule out electrolyte abnormality as potential cause for his symptoms.    Patient with laboratory workup which is negative/normal.  CMP normal.  Troponin negative.  CBC normal.    Patient was slightly hypertensive vitals upon arrival.  Has nonfocal neurologic exam.  Based on the bilateral nature of patient's symptoms, with bilateral hand, and bilateral leg numbness and tingling which is only portions of the legs, I do not feel that this represents central etiology.  Patient will be discharged home after he did have some relief with hydroxyzine, and Ativan.  Given the negative troponin, and normal CBC, and CMP, I do feel it is reasonable for close  outpatient management.  Return instructions discussed.           I have reviewed the nursing notes.    I have reviewed the findings, diagnosis, plan and need for follow up with the patient.       Critical Care time:  none      NEW PRESCRIPTIONS STARTED AT TODAY'S ER VISIT  Discharge Medication List as of 4/25/2024 12:01 AM        START taking these medications    Details   hydrOXYzine HCl (ATARAX) 25 MG tablet Take 1-2 tablets (25-50 mg) by mouth 3 times daily as needed for anxiety, Disp-50 tablet, R-0, Local Print             Final diagnoses:   Numbness   Anxiety       4/24/2024   [facility] EMERGENCY DEPT       Olayinka, Narciso Hernandez MD  04/25/24 0140

## 2025-01-06 ENCOUNTER — HOSPITAL ENCOUNTER (EMERGENCY)
Facility: CLINIC | Age: 50
Discharge: HOME | End: 2025-01-06
Attending: EMERGENCY MEDICINE | Admitting: EMERGENCY MEDICINE

## 2025-01-06 VITALS
HEART RATE: 59 BPM | OXYGEN SATURATION: 96 % | RESPIRATION RATE: 19 BRPM | TEMPERATURE: 98.9 F | SYSTOLIC BLOOD PRESSURE: 156 MMHG | DIASTOLIC BLOOD PRESSURE: 117 MMHG

## 2025-01-06 DIAGNOSIS — R74.8 ELEVATED LIVER ENZYMES: ICD-10-CM

## 2025-01-06 DIAGNOSIS — E87.6 HYPOKALEMIA: ICD-10-CM

## 2025-01-06 DIAGNOSIS — R00.2 PALPITATIONS: ICD-10-CM

## 2025-01-06 LAB
ALBUMIN SERPL BCG-MCNC: 4.4 G/DL (ref 3.5–5.2)
ALP SERPL-CCNC: 82 U/L (ref 40–150)
ALT SERPL W P-5'-P-CCNC: 195 U/L (ref 0–70)
ANION GAP SERPL CALCULATED.3IONS-SCNC: 15 MMOL/L (ref 7–15)
AST SERPL W P-5'-P-CCNC: 340 U/L (ref 0–45)
BASOPHILS # BLD AUTO: 0.1 10E3/UL (ref 0–0.2)
BASOPHILS NFR BLD AUTO: 1 %
BILIRUB SERPL-MCNC: 0.8 MG/DL
BUN SERPL-MCNC: 7.2 MG/DL (ref 6–20)
CALCIUM SERPL-MCNC: 9.6 MG/DL (ref 8.8–10.4)
CHLORIDE SERPL-SCNC: 100 MMOL/L (ref 98–107)
CREAT SERPL-MCNC: 0.75 MG/DL (ref 0.67–1.17)
EGFRCR SERPLBLD CKD-EPI 2021: >90 ML/MIN/1.73M2
EOSINOPHIL # BLD AUTO: 0 10E3/UL (ref 0–0.7)
EOSINOPHIL NFR BLD AUTO: 1 %
ERYTHROCYTE [DISTWIDTH] IN BLOOD BY AUTOMATED COUNT: 11.7 % (ref 10–15)
GLUCOSE SERPL-MCNC: 88 MG/DL (ref 70–99)
HCO3 SERPL-SCNC: 28 MMOL/L (ref 22–29)
HCT VFR BLD AUTO: 39.7 % (ref 40–53)
HGB BLD-MCNC: 14.1 G/DL (ref 13.3–17.7)
HOLD SPECIMEN: NORMAL
IMM GRANULOCYTES # BLD: 0 10E3/UL
IMM GRANULOCYTES NFR BLD: 0 %
LYMPHOCYTES # BLD AUTO: 1.3 10E3/UL (ref 0.8–5.3)
LYMPHOCYTES NFR BLD AUTO: 22 %
MCH RBC QN AUTO: 34 PG (ref 26.5–33)
MCHC RBC AUTO-ENTMCNC: 35.5 G/DL (ref 31.5–36.5)
MCV RBC AUTO: 96 FL (ref 78–100)
MONOCYTES # BLD AUTO: 1 10E3/UL (ref 0–1.3)
MONOCYTES NFR BLD AUTO: 17 %
NEUTROPHILS # BLD AUTO: 3.4 10E3/UL (ref 1.6–8.3)
NEUTROPHILS NFR BLD AUTO: 59 %
NRBC # BLD AUTO: 0 10E3/UL
NRBC BLD AUTO-RTO: 0 /100
PLATELET # BLD AUTO: 322 10E3/UL (ref 150–450)
POTASSIUM SERPL-SCNC: 3.1 MMOL/L (ref 3.4–5.3)
PROT SERPL-MCNC: 6.8 G/DL (ref 6.4–8.3)
RBC # BLD AUTO: 4.15 10E6/UL (ref 4.4–5.9)
SODIUM SERPL-SCNC: 143 MMOL/L (ref 135–145)
TROPONIN T SERPL HS-MCNC: 11 NG/L
TSH SERPL DL<=0.005 MIU/L-ACNC: 0.41 UIU/ML (ref 0.3–4.2)
WBC # BLD AUTO: 5.8 10E3/UL (ref 4–11)

## 2025-01-06 PROCEDURE — 93010 ELECTROCARDIOGRAM REPORT: CPT | Performed by: EMERGENCY MEDICINE

## 2025-01-06 PROCEDURE — 84443 ASSAY THYROID STIM HORMONE: CPT | Performed by: EMERGENCY MEDICINE

## 2025-01-06 PROCEDURE — 82947 ASSAY GLUCOSE BLOOD QUANT: CPT | Performed by: EMERGENCY MEDICINE

## 2025-01-06 PROCEDURE — 84484 ASSAY OF TROPONIN QUANT: CPT | Performed by: EMERGENCY MEDICINE

## 2025-01-06 PROCEDURE — 99284 EMERGENCY DEPT VISIT MOD MDM: CPT | Performed by: EMERGENCY MEDICINE

## 2025-01-06 PROCEDURE — 250N000013 HC RX MED GY IP 250 OP 250 PS 637: Performed by: EMERGENCY MEDICINE

## 2025-01-06 PROCEDURE — 99284 EMERGENCY DEPT VISIT MOD MDM: CPT | Mod: 25 | Performed by: EMERGENCY MEDICINE

## 2025-01-06 PROCEDURE — 93005 ELECTROCARDIOGRAM TRACING: CPT | Performed by: EMERGENCY MEDICINE

## 2025-01-06 PROCEDURE — 85025 COMPLETE CBC W/AUTO DIFF WBC: CPT | Performed by: EMERGENCY MEDICINE

## 2025-01-06 PROCEDURE — 36415 COLL VENOUS BLD VENIPUNCTURE: CPT | Performed by: EMERGENCY MEDICINE

## 2025-01-06 PROCEDURE — 82435 ASSAY OF BLOOD CHLORIDE: CPT | Performed by: EMERGENCY MEDICINE

## 2025-01-06 RX ORDER — POTASSIUM CHLORIDE 1.5 G/1.58G
40 POWDER, FOR SOLUTION ORAL ONCE
Status: COMPLETED | OUTPATIENT
Start: 2025-01-06 | End: 2025-01-06

## 2025-01-06 RX ADMIN — POTASSIUM CHLORIDE 40 MEQ: 1.5 POWDER, FOR SOLUTION ORAL at 12:43

## 2025-01-06 ASSESSMENT — COLUMBIA-SUICIDE SEVERITY RATING SCALE - C-SSRS
2. HAVE YOU ACTUALLY HAD ANY THOUGHTS OF KILLING YOURSELF IN THE PAST MONTH?: NO
1. IN THE PAST MONTH, HAVE YOU WISHED YOU WERE DEAD OR WISHED YOU COULD GO TO SLEEP AND NOT WAKE UP?: NO
6. HAVE YOU EVER DONE ANYTHING, STARTED TO DO ANYTHING, OR PREPARED TO DO ANYTHING TO END YOUR LIFE?: NO

## 2025-01-06 ASSESSMENT — ENCOUNTER SYMPTOMS
EYES NEGATIVE: 1
PSYCHIATRIC NEGATIVE: 1
GASTROINTESTINAL NEGATIVE: 1
HEMATOLOGIC/LYMPHATIC NEGATIVE: 1
CONSTITUTIONAL NEGATIVE: 1
PALPITATIONS: 1
ENDOCRINE NEGATIVE: 1
NEUROLOGICAL NEGATIVE: 1
MUSCULOSKELETAL NEGATIVE: 1
ALLERGIC/IMMUNOLOGIC NEGATIVE: 1

## 2025-01-06 ASSESSMENT — ACTIVITIES OF DAILY LIVING (ADL)
ADLS_ACUITY_SCORE: 41
ADLS_ACUITY_SCORE: 41

## 2025-01-06 NOTE — DISCHARGE INSTRUCTIONS
1) Your patient today revealed that your liver enzymes are elevated this could be related to your alcohol use is reported your potassium was on the lower limits of normal and repletion would be beneficial to reduce the risk of an arrhythmia.  We have discussed the need to follow-up on your liver enzymes-lab recheck in 1 month to ensure this is not rising and that further imaging such as ultrasound could be pursued to ensure he did not have fatty liver disease or cirrhosis.    2) Your test results with low potassium and elevated liver enzymes do not necessarily correspond or would explain the palpitations you felt earlier today prior to presenting for care discussed the risk of arrhythmia including undiagnosed or paroxysmal atrial fibrillation which could potentially increase her risk for stroke.  If you develop recurrence of symptoms you should either return to be reexamined or follow-up with your clinic provider

## 2025-01-06 NOTE — ED NOTES
Pt presents to ED with c/o palpitations this AM for about an hour. States has experienced palpitations prior but hasn't lasted this long. Pt also reports anxiety and CP at that time. Denies CP currently.

## 2025-01-06 NOTE — ED PROVIDER NOTES
History     Chief Complaint   Patient presents with    Palpitations     HPI  Sebastian Hatch is a 49 year old male who presents for evaluation with report of palpitations.    Reviewed the medical record.  Reviewed visit on 4/24/24-patient has a prior diagnosis of depression, tobacco use disorder, migraine headaches and a history of alcohol use disorder.    On examination on arrival patient  reports that he woke up this morning while preparing for his and felt an hour of palpitations.  No prior history of heart disease.  He does smoke tobacco and reports he is working on quitting.  He reports he drinks alcohol casually typically a couple days a week and mostly on the weekends after hard days work.  He also reports that he smokes marijuana recreationally.  He works in construction.  No prior diagnosis of heart disease.  No recent fall or trauma.  He has had no recent illness.  He did not have any fainting spells and reports the palpitations resolved.  He felt his pulse was rapid.  There was not irregular given the duration of his palpitations he arrived by car alone for further assessment and care.    Allergies:  Allergies   Allergen Reactions    Nka [No Known Allergies]        Problem List:    Patient Active Problem List    Diagnosis Date Noted    MENTAL HEALTH 11/22/2013     Priority: Medium    Alcohol abuse 05/31/2012     Priority: Medium    Shoulder and upper arm, superficial foreign body (splinter) 08/18/2011     Priority: Medium     Problem list name updated by automated process. Provider to review      GERD (gastroesophageal reflux disease) 03/24/2011     Priority: Medium    Moderate major depression (H) 03/24/2011     Priority: Medium    Migraine headache 03/24/2011     Priority: Medium     (Problem list name updated by automated process. Provider to review and confirm.)      Tobacco abuse 11/05/2010     Priority: Medium    CARDIOVASCULAR SCREENING; LDL GOAL LESS THAN 160 10/31/2010     Priority: Medium         Past Medical History:    Past Medical History:   Diagnosis Date    Depressive disorder     Gastro-oesophageal reflux disease     Migraine     NO ACTIVE PROBLEMS        Past Surgical History:    Past Surgical History:   Procedure Laterality Date    LAPAROSCOPIC CHOLECYSTECTOMY N/A 3/2/2017    Procedure: LAPAROSCOPIC CHOLECYSTECTOMY;  Surgeon: Dwain Kent MD;  Location: [facility]    SURGICAL HISTORY OF -       left knee ACL replacement       Family History:    Family History   Problem Relation Age of Onset    Diabetes Father     Hypertension Father     Unknown/Adopted Maternal Grandfather     Unknown/Adopted Paternal Grandmother     Cancer Paternal Grandmother     Respiratory Paternal Grandfather         lung cancer    C.A.D. Paternal Grandfather     Cardiovascular Sister          at 3 days, malformed aorta    Genitourinary Problems Other         kidney failure, on dialysis, unclear reason    Cancer - colorectal Mother     Asthma No family hx of     Cerebrovascular Disease No family hx of     Breast Cancer No family hx of     Prostate Cancer No family hx of        Social History:  Marital Status:  Single [1]  Social History     Tobacco Use    Smoking status: Every Day     Current packs/day: 1.00     Average packs/day: 1 pack/day for 10.0 years (10.0 ttl pk-yrs)     Types: Cigarettes    Smokeless tobacco: Former     Types: Chew   Substance Use Topics    Alcohol use: No     Comment: has not had any alchol in 5 months    Drug use: No        Medications:    HYDROcodone-acetaminophen (NORCO) 5-325 MG tablet  hydrOXYzine HCl (ATARAX) 25 MG tablet  naproxen (NAPROSYN) 500 MG tablet          Review of Systems   Constitutional: Negative.    HENT: Negative.     Eyes: Negative.    Cardiovascular:  Positive for palpitations.   Gastrointestinal: Negative.    Endocrine: Negative.    Genitourinary: Negative.    Musculoskeletal: Negative.    Skin: Negative.    Allergic/Immunologic: Negative.    Neurological: Negative.     Hematological: Negative.    Psychiatric/Behavioral: Negative.     All other systems reviewed and are negative.      Physical Exam   BP: (!) 154/112  Pulse: 72  Temp: 98.9  F (37.2  C)  Resp: 13  SpO2: 95 %      Physical Exam  Constitutional:       General: He is not in acute distress.     Appearance: Normal appearance. He is not ill-appearing, toxic-appearing or diaphoretic.   HENT:      Head: Normocephalic and atraumatic.      Right Ear: Tympanic membrane normal.      Left Ear: Tympanic membrane normal.      Nose: Nose normal.      Mouth/Throat:      Mouth: Mucous membranes are moist.   Eyes:      Extraocular Movements: Extraocular movements intact.      Pupils: Pupils are equal, round, and reactive to light.   Cardiovascular:      Rate and Rhythm: Normal rate and regular rhythm.      Pulses: Normal pulses.      Heart sounds: Normal heart sounds.   Pulmonary:      Effort: Pulmonary effort is normal. No respiratory distress.      Breath sounds: Normal breath sounds. No stridor. No wheezing, rhonchi or rales.   Chest:      Chest wall: No tenderness.   Musculoskeletal:         General: No swelling, tenderness, deformity or signs of injury.      Cervical back: Normal range of motion and neck supple.      Right lower leg: No edema.      Left lower leg: No edema.   Skin:     Capillary Refill: Capillary refill takes less than 2 seconds.      Coloration: Skin is not jaundiced or pale.      Findings: No bruising, erythema, lesion or rash.   Neurological:      General: No focal deficit present.      Mental Status: He is alert and oriented to person, place, and time.      Cranial Nerves: No cranial nerve deficit.      Sensory: No sensory deficit.      Motor: No weakness.      Coordination: Coordination normal.      Gait: Gait normal.      Deep Tendon Reflexes: Reflexes normal.   Psychiatric:         Mood and Affect: Mood normal.         Behavior: Behavior normal.         Thought Content: Thought content normal.          Judgment: Judgment normal.         ED Course        Procedures              EKG Interpretation:      Interpreted by Thomas Ram MD  Time reviewed: 1020  Symptoms at time of EKG: None  Rhythm: normal sinus   Rate: Normal  Axis: Normal  Ectopy: none  Conduction: normal  ST Segments/ T Waves: No ST-T wave changes  Q Waves: nonspecific  Comparison to prior: When compared with EKG dated 9/16/2019-no acute change appreciated    Clinical Impression: no acute changes      Critical Care time:  none          ED medications:  Medications   potassium chloride (KLOR-CON) Packet 40 mEq (40 mEq Oral $Given 1/6/25 1243)     ED Vitals:  Vitals:    01/06/25 1100 01/06/25 1104 01/06/25 1200 01/06/25 1230   BP: (!) 156/117      Pulse: 65  63 59   Resp: 19      Temp:  98.9  F (37.2  C)     TempSrc:  Oral     SpO2: 95%  95% 96%     ED labs and imaging:  Results for orders placed or performed during the hospital encounter of 01/06/25   Cope Draw     Status: None (In process)    Narrative    The following orders were created for panel order Cope Draw.  Procedure                               Abnormality         Status                     ---------                               -----------         ------                     Extra Blue Top Tube[[id number]]                              In process                 Extra Green Top (Lithium...[[id number]]                                                 Extra Purple Top Tube[[id number]]                                                         Please view results for these tests on the individual orders.   Comprehensive metabolic panel     Status: Abnormal   Result Value Ref Range    Sodium 143 135 - 145 mmol/L    Potassium 3.1 (L) 3.4 - 5.3 mmol/L    Carbon Dioxide (CO2) 28 22 - 29 mmol/L    Anion Gap 15 7 - 15 mmol/L    Urea Nitrogen 7.2 6.0 - 20.0 mg/dL    Creatinine 0.75 0.67 - 1.17 mg/dL    GFR Estimate >90 >60 mL/min/1.73m2    Calcium 9.6 8.8 - 10.4 mg/dL    Chloride 100 98 - 107  mmol/L    Glucose 88 70 - 99 mg/dL    Alkaline Phosphatase 82 40 - 150 U/L     (H) 0 - 45 U/L     (H) 0 - 70 U/L    Protein Total 6.8 6.4 - 8.3 g/dL    Albumin 4.4 3.5 - 5.2 g/dL    Bilirubin Total 0.8 <=1.2 mg/dL   Troponin T, High Sensitivity     Status: Normal   Result Value Ref Range    Troponin T, High Sensitivity 11 <=22 ng/L   TSH with free T4 reflex     Status: Normal   Result Value Ref Range    TSH 0.41 0.30 - 4.20 uIU/mL   CBC with platelets and differential     Status: Abnormal   Result Value Ref Range    WBC Count 5.8 4.0 - 11.0 10e3/uL    RBC Count 4.15 (L) 4.40 - 5.90 10e6/uL    Hemoglobin 14.1 13.3 - 17.7 g/dL    Hematocrit 39.7 (L) 40.0 - 53.0 %    MCV 96 78 - 100 fL    MCH 34.0 (H) 26.5 - 33.0 pg    MCHC 35.5 31.5 - 36.5 g/dL    RDW 11.7 10.0 - 15.0 %    Platelet Count 322 150 - 450 10e3/uL    % Neutrophils 59 %    % Lymphocytes 22 %    % Monocytes 17 %    % Eosinophils 1 %    % Basophils 1 %    % Immature Granulocytes 0 %    NRBCs per 100 WBC 0 <1 /100    Absolute Neutrophils 3.4 1.6 - 8.3 10e3/uL    Absolute Lymphocytes 1.3 0.8 - 5.3 10e3/uL    Absolute Monocytes 1.0 0.0 - 1.3 10e3/uL    Absolute Eosinophils 0.0 0.0 - 0.7 10e3/uL    Absolute Basophils 0.1 0.0 - 0.2 10e3/uL    Absolute Immature Granulocytes 0.0 <=0.4 10e3/uL    Absolute NRBCs 0.0 10e3/uL   CBC with platelets differential     Status: Abnormal    Narrative    The following orders were created for panel order CBC with platelets differential.  Procedure                               Abnormality         Status                     ---------                               -----------         ------                     CBC with platelets and d...[[id number]]  Abnormal            Final result                 Please view results for these tests on the individual orders.       Assessments & Plan (with Medical Decision Making)   Assessment Summary and clinical Impression: 49-year-old male who  presented with report of palpitations  lasting about an hour prior to presenting for care.  Prior history of palpitations that was self-limiting but no prior diagnosis of tachyarrhythmia.he confirmed his history of tobacco use disorder and alcohol dependence casually with marijuana use schedule on arrival blood pressure was 151/107.  Pulse was 70. 95% room air.  EKG on arrival revealed no acute change from viewable comparison from 9/16/2019.  The patient was asymptomatic on arrival. Patient remained asymptomatic during his ED course of care.  Normal cardiac and lung exam.  Given his comorbidities and age with palpitations though self-limiting workup was undertaken to ensure his palpitations were related to electrolyte derangement given history of alcohol dependence.  Patient was found to have mild hypokalemia potassium 3 point which was repleted orally.  He also was found to have elevated liver enzymes.  I suspect metabolic associated liver disease discussed the need to trend his LFTs within 1 month to be coordinated by his clinic provider and follow-up abdominal imaging to ensure he does not develop a liver injury or other given his history of alcohol dependence. He was also counseled on smoking cessation and his alcohol dependence.  We reviewed low threshold to return for recurrence or if there are new concerns .    ED course and plan:  The medical record.  Reviewed visit in April 2424.  EKG on arrival when compared with EKG from 9/16/2019 revealed no acute ischemia or arrhythmia normal sinus rhythm.  QTc was 454 ms.  Patient's workup revealed normal troponin on arrival.  His metabolic profile revealed elevated liver enzymes which could be related to his alcohol dependence as needed during today.  Mild hypokalemia with potassium 3.1.  Normal hemoglobin.  TSH was within normal limits.  We reviewed his abnormal liver enzymes and the need to trend within 1 month to ensure that he was not developing liver injury including metabolic associated fatty liver  disease or cirrhosis.  We discussed follow-up imaging going ultrasound coordinated by his clinic provider especially if he makes lifestyle adjustment including degree and amount of alcohol dependence.  We discussed the need to return to be reexamined if he has recurrence of palpitations especially for the possibility paroxysmal arrhythmia such as atrial fibrillation during his ED course of care patient was asymptomatic and did not have any recurrence of his palpitations.  He expressed understanding and comfort with the plan of care.      Disclaimer: This note consists of symbols derived from keyboarding, dictation and/or voice recognition software. As a result, there may be errors in the script that have gone undetected. Please consider this when interpreting information found in this chart.   I have reviewed the nursing notes.    I have reviewed the findings, diagnosis, plan and need for follow up with the patient.           Medical Decision Making  The patient's presentation was of high complexity (palpitations, history of tobacco use disorder and alcohol use disorder).    The patient's evaluation involved:  ordering and/or review of 2 test(s) in this encounter (telemetry monitoring, blood work,)    The patient's management necessitated high risk (need for follow-up imaging).        Discharge Medication List as of 1/6/2025 12:48 PM          Final diagnoses:   Elevated liver enzymes   Hypokalemia   Palpitations       1/6/2025   [facility] EMERGENCY DEPT       Thomas Ram MD  01/06/25 [id number]

## 2025-01-08 LAB
ATRIAL RATE - MUSE: 67 BPM
DIASTOLIC BLOOD PRESSURE - MUSE: NORMAL MMHG
INTERPRETATION ECG - MUSE: NORMAL
P AXIS - MUSE: 64 DEGREES
PR INTERVAL - MUSE: 168 MS
QRS DURATION - MUSE: 92 MS
QT - MUSE: 430 MS
QTC - MUSE: 454 MS
R AXIS - MUSE: 14 DEGREES
SYSTOLIC BLOOD PRESSURE - MUSE: NORMAL MMHG
T AXIS - MUSE: 18 DEGREES
VENTRICULAR RATE- MUSE: 67 BPM

## (undated) DEVICE — ENDO SHEARS RENEW LAP ENDOCUT SCISSOR TIP 16.5MM 3142

## (undated) DEVICE — ESU HOLDER LAP INST DISP PURPLE LONG 330MM H-PRO-330

## (undated) DEVICE — SOL NACL 0.9% IRRIG 1000ML BOTTLE 07138-09

## (undated) DEVICE — PREP CHLORAPREP 26ML TINTED ORANGE  260815

## (undated) DEVICE — STOCKING SLEEVE COMPRESSION CALF MED

## (undated) DEVICE — Device

## (undated) DEVICE — NDL INSUFFLATION 120MM VERRES 172015

## (undated) DEVICE — SYR 05ML LL W/O NDL

## (undated) DEVICE — SU MONOCRYL 4-0 PS-2 18" UND Y496G

## (undated) DEVICE — GLOVE PROTEXIS W/NEU-THERA 6.5  2D73TE65

## (undated) DEVICE — DECANTER VIAL 2006S

## (undated) DEVICE — ENDO TROCAR 12MM VERSAONE BLADELESS W/STD FIX CAN NONB12STF

## (undated) DEVICE — ENDO CANNULA 05MM VERSAONE UNIVERSAL UNVCA5STF

## (undated) DEVICE — SOL WATER IRRIG 1000ML BOTTLE 07139-09

## (undated) DEVICE — GLOVE PROTEXIS BLUE W/NEU-THERA 6.5  2D73EB65

## (undated) DEVICE — ADHESIVE SWIFTSET 0.8ML OCTYL SS6

## (undated) DEVICE — CLIP APPLIER ENDO 05MM MED/LG 176630

## (undated) DEVICE — GOWN LG DISP 9515

## (undated) DEVICE — ENDO TROCAR OPTICAL 05MM VERSAPORT PLUS W/FIX CAN ONB5STF

## (undated) RX ORDER — PROPOFOL 10 MG/ML
INJECTION, EMULSION INTRAVENOUS
Status: DISPENSED
Start: 2017-03-02

## (undated) RX ORDER — DEXAMETHASONE SODIUM PHOSPHATE 4 MG/ML
INJECTION, SOLUTION INTRA-ARTICULAR; INTRALESIONAL; INTRAMUSCULAR; INTRAVENOUS; SOFT TISSUE
Status: DISPENSED
Start: 2017-03-02

## (undated) RX ORDER — FENTANYL CITRATE 50 UG/ML
INJECTION, SOLUTION INTRAMUSCULAR; INTRAVENOUS
Status: DISPENSED
Start: 2017-03-02

## (undated) RX ORDER — MEPERIDINE HYDROCHLORIDE 25 MG/ML
INJECTION INTRAMUSCULAR; INTRAVENOUS; SUBCUTANEOUS
Status: DISPENSED
Start: 2017-03-02

## (undated) RX ORDER — HYDROXYZINE HYDROCHLORIDE 50 MG/1
TABLET, FILM COATED ORAL
Status: DISPENSED
Start: 2017-03-02

## (undated) RX ORDER — BUPIVACAINE HYDROCHLORIDE AND EPINEPHRINE 2.5; 5 MG/ML; UG/ML
INJECTION, SOLUTION INFILTRATION; PERINEURAL
Status: DISPENSED
Start: 2017-03-02

## (undated) RX ORDER — LIDOCAINE HYDROCHLORIDE 10 MG/ML
INJECTION, SOLUTION EPIDURAL; INFILTRATION; INTRACAUDAL; PERINEURAL
Status: DISPENSED
Start: 2017-03-02

## (undated) RX ORDER — NEOSTIGMINE METHYLSULFATE 5 MG/5 ML
SYRINGE (ML) INTRAVENOUS
Status: DISPENSED
Start: 2017-03-02

## (undated) RX ORDER — ONDANSETRON 2 MG/ML
INJECTION INTRAMUSCULAR; INTRAVENOUS
Status: DISPENSED
Start: 2017-03-02

## (undated) RX ORDER — SCOLOPAMINE TRANSDERMAL SYSTEM 1 MG/1
PATCH, EXTENDED RELEASE TRANSDERMAL
Status: DISPENSED
Start: 2017-03-02

## (undated) RX ORDER — GLYCOPYRROLATE 0.2 MG/ML
INJECTION, SOLUTION INTRAMUSCULAR; INTRAVENOUS
Status: DISPENSED
Start: 2017-03-02

## (undated) RX ORDER — HYDROMORPHONE HYDROCHLORIDE 1 MG/ML
INJECTION, SOLUTION INTRAMUSCULAR; INTRAVENOUS; SUBCUTANEOUS
Status: DISPENSED
Start: 2017-03-02